# Patient Record
Sex: FEMALE | Race: WHITE | NOT HISPANIC OR LATINO | Employment: FULL TIME
[De-identification: names, ages, dates, MRNs, and addresses within clinical notes are randomized per-mention and may not be internally consistent; named-entity substitution may affect disease eponyms.]

---

## 2017-08-27 ENCOUNTER — HEALTH MAINTENANCE LETTER (OUTPATIENT)
Age: 22
End: 2017-08-27

## 2017-12-05 ENCOUNTER — TRANSFERRED RECORDS (OUTPATIENT)
Dept: HEALTH INFORMATION MANAGEMENT | Facility: CLINIC | Age: 22
End: 2017-12-05

## 2017-12-18 ENCOUNTER — OFFICE VISIT (OUTPATIENT)
Dept: FAMILY MEDICINE | Facility: CLINIC | Age: 22
End: 2017-12-18
Payer: COMMERCIAL

## 2017-12-18 VITALS
HEIGHT: 69 IN | DIASTOLIC BLOOD PRESSURE: 64 MMHG | HEART RATE: 60 BPM | SYSTOLIC BLOOD PRESSURE: 112 MMHG | BODY MASS INDEX: 19.13 KG/M2 | WEIGHT: 129.2 LBS

## 2017-12-18 DIAGNOSIS — Z12.4 SCREENING FOR CERVICAL CANCER: ICD-10-CM

## 2017-12-18 DIAGNOSIS — M25.561 CHRONIC PAIN OF RIGHT KNEE: ICD-10-CM

## 2017-12-18 DIAGNOSIS — G89.29 CHRONIC PAIN OF RIGHT KNEE: ICD-10-CM

## 2017-12-18 DIAGNOSIS — Z01.419 ENCOUNTER FOR GYNECOLOGICAL EXAMINATION WITHOUT ABNORMAL FINDING: Primary | ICD-10-CM

## 2017-12-18 DIAGNOSIS — L30.9 DERMATITIS: ICD-10-CM

## 2017-12-18 PROCEDURE — G0145 SCR C/V CYTO,THINLAYER,RESCR: HCPCS | Performed by: FAMILY MEDICINE

## 2017-12-18 PROCEDURE — 99385 PREV VISIT NEW AGE 18-39: CPT | Performed by: FAMILY MEDICINE

## 2017-12-18 PROCEDURE — 99213 OFFICE O/P EST LOW 20 MIN: CPT | Mod: 25 | Performed by: FAMILY MEDICINE

## 2017-12-18 RX ORDER — TRIAMCINOLONE ACETONIDE 1 MG/G
CREAM TOPICAL
Qty: 15 G | Refills: 0 | Status: SHIPPED | OUTPATIENT
Start: 2017-12-18 | End: 2019-11-07

## 2017-12-18 NOTE — MR AVS SNAPSHOT
After Visit Summary   12/18/2017    Rhiannon Landeros    MRN: [id number]           Patient Information     Date Of Birth          1995        Visit Information        Provider Department      12/18/2017 9:40 AM Heather Resendiz DO [facility]        Today's Diagnoses     Encounter for gynecological examination without abnormal finding    -  1    Screening for cervical cancer        Dermatitis        Chronic pain of right knee          Care Instructions    Check out the exercises for your knee.  Please let us know if things do not improve    We'll try the steroid cream for the belly button.  This can be used twice daily for 7 days and sparingly as needed thereafter.  Please let us know if things do not clear up.      Preventive Health Recommendations  Female Ages 18 to 25     Yearly exam:     See your health care provider every year in order to  o Review health changes.   o Discuss preventive care.    o Review your medicines if your doctor has prescribed any.      You should be tested each year for STDs (sexually transmitted diseases).       After age 20, talk to your provider about how often you should have cholesterol testing.      Starting at age 21, get a Pap test every three years. If you have an abnormal result, your doctor may have you test more often.      If you are at risk for diabetes, you should have a diabetes test (fasting glucose).     Shots:     Get a flu shot each year.     Get a tetanus shot every 10 years.     Consider getting the shot (vaccine) that prevents cervical cancer (Gardasil).    Nutrition:     Eat at least 5 servings of fruits and vegetables each day.    Eat whole-grain bread, whole-wheat pasta and brown rice instead of white grains and rice.    Talk to your provider about Calcium and Vitamin D.     Lifestyle    Exercise at least 150 minutes a week each week (30 minutes a day, 5 days a week). This will help you control your weight and prevent  disease.    Limit alcohol to one drink per day.    No smoking.     Wear sunscreen to prevent skin cancer.    See your dentist every six months for an exam and cleaning.  Preventive Health Recommendations  Female Ages 18 to 25     Yearly exam:   See your health care provider every year in order to  Review health changes.   Discuss preventive care.    Review your medicines if your doctor has prescribed any.    You should be tested each year for STDs (sexually transmitted diseases).     After age 20, talk to your provider about how often you should have cholesterol testing.    Starting at age 21, get a Pap test every three years. If you have an abnormal result, your doctor may have you test more often.    If you are at risk for diabetes, you should have a diabetes test (fasting glucose).     Shots:   Get a flu shot each year.   Get a tetanus shot every 10 years.   Consider getting the shot (vaccine) that prevents cervical cancer (Gardasil).    Nutrition:   Eat at least 5 servings of fruits and vegetables each day.  Eat whole-grain bread, whole-wheat pasta and brown rice instead of white grains and rice.  Talk to your provider about Calcium and Vitamin D.     Lifestyle  Exercise at least 150 minutes a week each week (30 minutes a day, 5 days a week). This will help you control your weight and prevent disease.  Limit alcohol to one drink per day.  No smoking.   Wear sunscreen to prevent skin cancer.  See your dentist every six months for an exam and cleaning.          Follow-ups after your visit        Who to contact     Normal or non-critical lab and imaging results will be communicated to you by MyChart, letter or phone within 4 business days after the clinic has received the results. If you do not hear from us within 7 days, please contact the clinic through [employer] or phone. If you have a critical or abnormal lab result, we will notify you by phone as soon as possible.  Submit refill requests through [employer] or call  "your pharmacy and they will forward the refill request to us. Please allow 3 business days for your refill to be completed.          If you need to speak with a  for additional information , please call: [phone]           Additional Information About Your Visit        [employer] Information     [employer] lets you send messages to your doctor, view your test results, renew your prescriptions, schedule appointments and more. To sign up, go to [email or web address] . Click on \"Log in\" on the left side of the screen, which will take you to the Welcome page. Then click on \"Sign up Now\" on the right side of the page.     You will be asked to enter the access code listed below, as well as some personal information. Please follow the directions to create your username and password.     Your access code is: [email or web address]  Expires: 3/18/2018 10:22 AM     Your access code will  in 90 days. If you need help or a new code, please call your [facility] clinic or [phone].        Care EveryWhere ID     This is your Care EveryWhere ID. This could be used by other organizations to access your [facility] medical records  [id number]        Your Vitals Were     Pulse Height Last Period BMI (Body Mass Index)          60 5' 9\" (1.753 m) 2017 (Approximate) 19.08 kg/m2         Blood Pressure from Last 3 Encounters:   17 112/64   16 112/59   16 117/58    Weight from Last 3 Encounters:   17 129 lb 3.2 oz (58.6 kg)   16 120 lb (54.4 kg)   16 128 lb 6.4 oz (58.2 kg) (50 %)*     * Growth percentiles are based on CDC 2-20 Years data.              We Performed the Following     Pap imaged thin layer screen only - recommended age 21 - 24 years          Today's Medication Changes          These changes are accurate as of: 17 10:22 AM.  If you have any questions, ask your nurse or doctor.               Start taking these medicines.        Dose/Directions    triamcinolone 0.1 % " cream   Commonly known as:  KENALOG   Used for:  Dermatitis   Started by:  Heather Resendiz, DO        Apply sparingly to affected area two times daily for 7 days.  May apply periodically therafter   Quantity:  15 g   Refills:  0            Where to get your medicines      These medications were sent to [facility] - [place], [place] - [address]  [address] [facility] [address]     Phone:  [phone]     triamcinolone 0.1 % cream                Primary Care Provider Office Phone # Fax #    Mook Gracia [phone] [address] [facility] [address]        Equal Access to Services     [facility] : Hadii aad ku hadasho Soomaali, waaxda luqadaha, qaybta kaalmada adeegyada, waxuvaldo campo haycarl felix . So [facility] [phone].    ATENCIÓN: Si habla español, tiene a andres disposición servicios gratuitos de asistencia lingüística. Llame al [phone].    We comply with applicable federal civil rights laws and [place] laws. We do not discriminate on the basis of race, color, national origin, age, disability, sex, sexual orientation, or gender identity.            Thank you!     Thank you for choosing [facility]  for your care. Our goal is always to provide you with excellent care. Hearing back from our patients is one way we can continue to improve our services. Please take a few minutes to complete the written survey that you may receive in the mail after your visit with us. Thank you!             Your Updated Medication List - Protect others around you: Learn how to safely use, store and throw away your medicines at www.disposemymeds.org.          This list is accurate as of: 12/18/17 10:22 AM.  Always use your most recent med list.                   Brand Name Dispense Instructions for use Diagnosis    etonogestrel 68 MG Impl    IMPLANON/NEXPLANON    1 each    1 each (68 mg) by Subdermal route once for 1 dose    Nexplanon  removal, Nexplanon insertion       triamcinolone 0.1 % cream    KENALOG    15 g    Apply sparingly to affected area two times daily for 7 days.  May apply periodically therafter    Dermatitis

## 2017-12-18 NOTE — NURSING NOTE
"Chief Complaint   Patient presents with     Physical     Derm Problem     infection/irritation for the last year to the skin around her naval. She has tried salt water rinses        Initial /64 (BP Location: Left arm, Patient Position: Chair, Cuff Size: Adult Regular)  Pulse 60  Ht 5' 9\" (1.753 m)  Wt 129 lb 3.2 oz (58.6 kg)  LMP 11/20/2017 (Approximate)  BMI 19.08 kg/m2 Estimated body mass index is 19.08 kg/(m^2) as calculated from the following:    Height as of this encounter: 5' 9\" (1.753 m).    Weight as of this encounter: 129 lb 3.2 oz (58.6 kg).  Medication Reconciliation: complete   Brooke Orellana CMA    "

## 2017-12-18 NOTE — LETTER
December 21, 2017      Rhiannon Landeros  [address]  [place] [address]    Dear ,      I am happy to inform you that your recent cervical cancer screening test (PAP smear) was normal.      Preventative screenings such as this help to ensure your health for years to come. You should repeat a pap smear in 3 years, unless otherwise directed.      You will still need to return to the clinic every year for your annual exam and other preventive tests.     Please contact the clinic at [phone] if you have further questions.       Sincerely,      Heather Resendiz DO/dandre

## 2017-12-18 NOTE — PROGRESS NOTES
SUBJECTIVE:   CC: Rhiannon Landeros is an 22 year old woman who presents for preventive health visit.     Chief Complaint   Patient presents with     Physical     Derm Problem     infection/irritation for the last year to the skin around her naval. She has tried salt water rinses    Belly button has been getting crusty starting about 1 year ago.  Used salt water washes for this as recommend by her grandmother.  Sometimes itchy      Had STD screening at [facility], positive for chlamydia.  Was treated 2 weeks ago.  Took her dose and has been abstaining from intercourse.    Intermittent knee pain for years.  Pain in the anterior knee.  Bothered by running.  Was in track in high school when this started.  Helped by a strap on the knee.  No swelling noted.  No locking or catching.  No recent injury.      Healthy Habits:    Do you get at least three servings of calcium containing foods daily (dairy, green leafy vegetables, etc.)? yes    Amount of exercise or daily activities, outside of work: 0 day(s) per week    Problems taking medications regularly No    Medication side effects: No    Have you had an eye exam in the past two years? no    Do you see a dentist twice per year? yes    Do you have sleep apnea, excessive snoring or daytime drowsiness?no    Today's PHQ-2 Score:   PHQ-2 ( 1999 Pfizer) 12/18/2017   Q1: Little interest or pleasure in doing things 0   Q2: Feeling down, depressed or hopeless 0   PHQ-2 Score 0   Abuse: Current or Past(Physical, Sexual or Emotional)- No  Do you feel safe in your environment - Yes  Social History   Substance Use Topics     Smoking status: Never Smoker     Smokeless tobacco: Never Used      Comment: no tobacco exposure     Alcohol use No     If you drink alcohol do you typically have >3 drinks per day or >7 drinks per week? No                     Reviewed orders with patient.  Reviewed health maintenance and updated orders accordingly - Yes    Mammogram not appropriate for  "this patient based on age.    Pertinent mammograms are reviewed under the imaging tab.  History of abnormal Pap smear: NO - age 21-29 PAP every 3 years recommended    Reviewed and updated as needed this visit by clinical staff  Tobacco  Allergies  Meds  Med Hx  Surg Hx  Fam Hx  Soc Hx        Reviewed and updated as needed this visit by Provider  Tobacco  Med Hx  Surg Hx  Fam Hx  Soc Hx       History reviewed. No pertinent past medical history.   History reviewed. No pertinent surgical history.    ROS:  C: NEGATIVE for fever, chills, change in weight  INTEGUMENTARY/SKIN: as above  E: NEGATIVE for vision changes or irritation  ENT: NEGATIVE for ear, mouth and throat problems  R: NEGATIVE for significant cough or SOB  B: NEGATIVE for masses, tenderness or discharge  CV: NEGATIVE for chest pain, palpitations or peripheral edema  GI: NEGATIVE for nausea, abdominal pain, heartburn, or change in bowel habits  : NEGATIVE for unusual urinary or vaginal symptoms. Periods are regular.  MUSCULOSKELETAL:as above  N: NEGATIVE for weakness, dizziness or paresthesias  P: NEGATIVE for changes in mood or affect    OBJECTIVE:   /64 (BP Location: Left arm, Patient Position: Chair, Cuff Size: Adult Regular)  Pulse 60  Ht 5' 9\" (1.753 m)  Wt 129 lb 3.2 oz (58.6 kg)  LMP 11/20/2017 (Approximate)  BMI 19.08 kg/m2  EXAM:  GENERAL: healthy, alert and no distress  EYES: Eyes grossly normal to inspection, PERRL and conjunctivae and sclerae normal  HENT: ear canals and TM's normal, nose and mouth without ulcers or lesions  NECK: no adenopathy, no asymmetry, masses, or scars and thyroid normal to palpation  RESP: lungs clear to auscultation - no rales, rhonchi or wheezes  BREAST: normal without masses, tenderness or nipple discharge and no palpable axillary masses or adenopathy  CV: regular rate and rhythm, normal S1 S2, no S3 or S4, no murmur, click or rub, no peripheral edema and peripheral pulses strong  ABDOMEN: soft, " "nontender, no hepatosplenomegaly, no masses and bowel sounds normal   (female): normal female external genitalia, normal urethral meatus, vaginal mucosa pink, moist, well rugated, and normal cervix/adnexa/uterus without masses or discharge  MS: no gross musculoskeletal defects noted, no edema.  Full pain free ROM of the R knee.  No joint line tenderness.  Ligamentous exam intact  SKIN: no suspicious lesions or rashes, mild erythema and fine scale in the umbilicus  NEURO: Normal strength and tone, mentation intact and speech normal  PSYCH: mentation appears normal, affect normal/bright    ASSESSMENT/PLAN:       ICD-10-CM    1. Encounter for gynecological examination without abnormal finding Z01.419    2. Dermatitis L30.9 triamcinolone (KENALOG) 0.1 % cream   3. Chronic pain of right knee M25.561     G89.29    4. Screening for cervical cancer Z12.4 Pap imaged thin layer screen only - recommended age 21 - 24 years       COUNSELING:   Reviewed preventive health counseling, as reflected in patient instructions         reports that she has never smoked. She has never used smokeless tobacco.    Estimated body mass index is 19.08 kg/(m^2) as calculated from the following:    Height as of this encounter: 5' 9\" (1.753 m).    Weight as of this encounter: 129 lb 3.2 oz (58.6 kg).         Counseling Resources:  ATP IV Guidelines  Pooled Cohorts Equation Calculator  Breast Cancer Risk Calculator  FRAX Risk Assessment  ICSI Preventive Guidelines  Dietary Guidelines for Americans, 2010  USDA's MyPlate  ASA Prophylaxis  Lung CA Screening    Heather Resendiz, DO  [facility]    Patient Instructions   Check out the exercises for your knee.  Please let us know if things do not improve    We'll try the steroid cream for the belly button.  This can be used twice daily for 7 days and sparingly as needed thereafter.  Please let us know if things do not clear up.      Preventive Health Recommendations  Female Ages 18 to 25 "     Yearly exam:     See your health care provider every year in order to  o Review health changes.   o Discuss preventive care.    o Review your medicines if your doctor has prescribed any.      You should be tested each year for STDs (sexually transmitted diseases).       After age 20, talk to your provider about how often you should have cholesterol testing.      Starting at age 21, get a Pap test every three years. If you have an abnormal result, your doctor may have you test more often.      If you are at risk for diabetes, you should have a diabetes test (fasting glucose).     Shots:     Get a flu shot each year.     Get a tetanus shot every 10 years.     Consider getting the shot (vaccine) that prevents cervical cancer (Gardasil).    Nutrition:     Eat at least 5 servings of fruits and vegetables each day.    Eat whole-grain bread, whole-wheat pasta and brown rice instead of white grains and rice.    Talk to your provider about Calcium and Vitamin D.     Lifestyle    Exercise at least 150 minutes a week each week (30 minutes a day, 5 days a week). This will help you control your weight and prevent disease.    Limit alcohol to one drink per day.    No smoking.     Wear sunscreen to prevent skin cancer.    See your dentist every six months for an exam and cleaning.  Preventive Health Recommendations  Female Ages 18 to 25     Yearly exam:   See your health care provider every year in order to  Review health changes.   Discuss preventive care.    Review your medicines if your doctor has prescribed any.    You should be tested each year for STDs (sexually transmitted diseases).     After age 20, talk to your provider about how often you should have cholesterol testing.    Starting at age 21, get a Pap test every three years. If you have an abnormal result, your doctor may have you test more often.    If you are at risk for diabetes, you should have a diabetes test (fasting glucose).     Shots:   Get a flu shot  each year.   Get a tetanus shot every 10 years.   Consider getting the shot (vaccine) that prevents cervical cancer (Gardasil).    Nutrition:   Eat at least 5 servings of fruits and vegetables each day.  Eat whole-grain bread, whole-wheat pasta and brown rice instead of white grains and rice.  Talk to your provider about Calcium and Vitamin D.     Lifestyle  Exercise at least 150 minutes a week each week (30 minutes a day, 5 days a week). This will help you control your weight and prevent disease.  Limit alcohol to one drink per day.  No smoking.   Wear sunscreen to prevent skin cancer.  See your dentist every six months for an exam and cleaning.

## 2017-12-18 NOTE — PATIENT INSTRUCTIONS
Check out the exercises for your knee.  Please let us know if things do not improve    We'll try the steroid cream for the belly button.  This can be used twice daily for 7 days and sparingly as needed thereafter.  Please let us know if things do not clear up.      Preventive Health Recommendations  Female Ages 18 to 25     Yearly exam:     See your health care provider every year in order to  o Review health changes.   o Discuss preventive care.    o Review your medicines if your doctor has prescribed any.      You should be tested each year for STDs (sexually transmitted diseases).       After age 20, talk to your provider about how often you should have cholesterol testing.      Starting at age 21, get a Pap test every three years. If you have an abnormal result, your doctor may have you test more often.      If you are at risk for diabetes, you should have a diabetes test (fasting glucose).     Shots:     Get a flu shot each year.     Get a tetanus shot every 10 years.     Consider getting the shot (vaccine) that prevents cervical cancer (Gardasil).    Nutrition:     Eat at least 5 servings of fruits and vegetables each day.    Eat whole-grain bread, whole-wheat pasta and brown rice instead of white grains and rice.    Talk to your provider about Calcium and Vitamin D.     Lifestyle    Exercise at least 150 minutes a week each week (30 minutes a day, 5 days a week). This will help you control your weight and prevent disease.    Limit alcohol to one drink per day.    No smoking.     Wear sunscreen to prevent skin cancer.    See your dentist every six months for an exam and cleaning.  Preventive Health Recommendations  Female Ages 18 to 25     Yearly exam:   See your health care provider every year in order to  Review health changes.   Discuss preventive care.    Review your medicines if your doctor has prescribed any.    You should be tested each year for STDs (sexually transmitted diseases).     After age 20,  talk to your provider about how often you should have cholesterol testing.    Starting at age 21, get a Pap test every three years. If you have an abnormal result, your doctor may have you test more often.    If you are at risk for diabetes, you should have a diabetes test (fasting glucose).     Shots:   Get a flu shot each year.   Get a tetanus shot every 10 years.   Consider getting the shot (vaccine) that prevents cervical cancer (Gardasil).    Nutrition:   Eat at least 5 servings of fruits and vegetables each day.  Eat whole-grain bread, whole-wheat pasta and brown rice instead of white grains and rice.  Talk to your provider about Calcium and Vitamin D.     Lifestyle  Exercise at least 150 minutes a week each week (30 minutes a day, 5 days a week). This will help you control your weight and prevent disease.  Limit alcohol to one drink per day.  No smoking.   Wear sunscreen to prevent skin cancer.  See your dentist every six months for an exam and cleaning.

## 2017-12-20 LAB
COPATH REPORT: NORMAL
PAP: NORMAL

## 2018-09-25 ENCOUNTER — OFFICE VISIT (OUTPATIENT)
Dept: DERMATOLOGY | Facility: CLINIC | Age: 23
End: 2018-09-25
Payer: COMMERCIAL

## 2018-09-25 VITALS — DIASTOLIC BLOOD PRESSURE: 84 MMHG | OXYGEN SATURATION: 100 % | SYSTOLIC BLOOD PRESSURE: 117 MMHG | HEART RATE: 57 BPM

## 2018-09-25 DIAGNOSIS — L60.3 NAIL DYSTROPHY: Primary | ICD-10-CM

## 2018-09-25 PROCEDURE — 11750 EXCISION NAIL&NAIL MATRIX: CPT | Performed by: DERMATOLOGY

## 2018-09-25 NOTE — LETTER
9/25/2018         RE: Rhiannon Landeros  [address]  [place] [address]        Dear Colleague,    Thank you for referring your patient, Rhiannon Landeros, to the [facility]. Please see a copy of my visit note below.    Rhiannon Landeros is a 23 year old year old female patient here today for hx of dystrophic nail removed she notes single area growing back.  .  Patient states this has been present for months.  Patient reports the following symptoms:  Caught on things. Patient reports the following previous treatments none.  Patient reports the following modifying factors none.  Associated symptoms: none.  Patient has no other skin complaints today.  Remainder of the HPI, Meds, PMH, Allergies, FH, and SH was reviewed in chart.    History reviewed. No pertinent past medical history.    History reviewed. No pertinent surgical history.     Family History   Problem Relation Age of Onset     Hypertension Maternal Grandmother      Diabetes Maternal Grandfather      Hypertension Maternal Grandfather      Breast Cancer Maternal Grandfather      Cancer Other      osteosarcoma     Melanoma No family hx of        Social History     Social History     Marital status: Single     Spouse name: N/A     Number of children: N/A     Years of education: N/A     Occupational History     Not on file.     Social History Main Topics     Smoking status: Never Smoker     Smokeless tobacco: Never Used      Comment: no tobacco exposure     Alcohol use No     Drug use: No     Sexual activity: Yes     Other Topics Concern     Not on file     Social History Narrative       Outpatient Encounter Prescriptions as of 9/25/2018   Medication Sig Dispense Refill     etonogestrel (IMPLANON/NEXPLANON) 68 MG IMPL 1 each (68 mg) by Subdermal route once for 1 dose 1 each 0     triamcinolone (KENALOG) 0.1 % cream Apply sparingly to affected area two times daily for 7 days.  May apply periodically therafter (Patient not taking: Reported on  9/25/2018) 15 g 0     No facility-administered encounter medications on file as of 9/25/2018.              Review Of Systems  Skin: As above  Eyes: negative  Ears/Nose/Throat: negative  Respiratory: No shortness of breath, dyspnea on exertion, cough, or hemoptysis  Cardiovascular: negative  Gastrointestinal: negative  Genitourinary: negative  Musculoskeletal: negative  Neurologic: negative  Psychiatric: negative  Hematologic/Lymphatic/Immunologic: negative  Endocrine: negative      O:   NAD, WDWN, Alert & Oriented, Mood & Affect wnl, Vitals stable   Here today alone   /84  Pulse 57  SpO2 100%   General appearance normal   Vitals stable   Alert, oriented and in no acute distress     L great toen ail with smal strip of nail on left medial aspect      Eyes: Conjunctivae/lids:Normal     ENT: Lips, buccal mucosa, tongue: normal    MSK:Normal    Cardiovascular: peripheral edema none    Pulm: Breathing Normal    Neuro/Psych: Orientation:Normal; Mood/Affect:Normal      A/P:  1. dystrohpic nail  TOE NAIL AVULSION WITH LATERAL MATRICECTOMY:  After consent, anesthesia with 1% lidocaine, and prep, the lateral portion of the nail was elevated from the nail bed, using a periosteal elevator, the lateral 4mm of the nail was avulsed using sterile nail splitter, cautery was applied to matrix at a current of 10, for 5 seconds and then repeated.      The site was dressed in the usual fashion,   Management: Home Instructions   Keep foot elevated for first 24 hours   Change dressing in 24 hours   Consider daily antibiotic ointment (e.g. Bacitracin) until heeled   Water exposure is controversial   Some recommend only showering, but no soakings   Others soak foot in warm soapy water 2-4 times daily for 4-7 days   Avoid trauma to toe for first 2 weeks   Wear loose-fitting shoes   Avoid Running, jumping or other potential injury   Observe for signs of infection       Nail avu      Again, thank you for allowing me to participate in  the care of your patient.        Sincerely,        Maximiliano Ray MD

## 2018-09-25 NOTE — PROGRESS NOTES
Rhiannon Landeros is a 23 year old year old female patient here today for hx of dystrophic nail removed she notes single area growing back.  .  Patient states this has been present for months.  Patient reports the following symptoms:  Caught on things. Patient reports the following previous treatments none.  Patient reports the following modifying factors none.  Associated symptoms: none.  Patient has no other skin complaints today.  Remainder of the HPI, Meds, PMH, Allergies, FH, and SH was reviewed in chart.    History reviewed. No pertinent past medical history.    History reviewed. No pertinent surgical history.     Family History   Problem Relation Age of Onset     Hypertension Maternal Grandmother      Diabetes Maternal Grandfather      Hypertension Maternal Grandfather      Breast Cancer Maternal Grandfather      Cancer Other      osteosarcoma     Melanoma No family hx of        Social History     Social History     Marital status: Single     Spouse name: N/A     Number of children: N/A     Years of education: N/A     Occupational History     Not on file.     Social History Main Topics     Smoking status: Never Smoker     Smokeless tobacco: Never Used      Comment: no tobacco exposure     Alcohol use No     Drug use: No     Sexual activity: Yes     Other Topics Concern     Not on file     Social History Narrative       Outpatient Encounter Prescriptions as of 9/25/2018   Medication Sig Dispense Refill     etonogestrel (IMPLANON/NEXPLANON) 68 MG IMPL 1 each (68 mg) by Subdermal route once for 1 dose 1 each 0     triamcinolone (KENALOG) 0.1 % cream Apply sparingly to affected area two times daily for 7 days.  May apply periodically therafter (Patient not taking: Reported on 9/25/2018) 15 g 0     No facility-administered encounter medications on file as of 9/25/2018.              Review Of Systems  Skin: As above  Eyes: negative  Ears/Nose/Throat: negative  Respiratory: No shortness of breath, dyspnea on exertion,  cough, or hemoptysis  Cardiovascular: negative  Gastrointestinal: negative  Genitourinary: negative  Musculoskeletal: negative  Neurologic: negative  Psychiatric: negative  Hematologic/Lymphatic/Immunologic: negative  Endocrine: negative      O:   NAD, WDWN, Alert & Oriented, Mood & Affect wnl, Vitals stable   Here today alone   /84  Pulse 57  SpO2 100%   General appearance normal   Vitals stable   Alert, oriented and in no acute distress     L great toen ail with smal strip of nail on left medial aspect      Eyes: Conjunctivae/lids:Normal     ENT: Lips, buccal mucosa, tongue: normal    MSK:Normal    Cardiovascular: peripheral edema none    Pulm: Breathing Normal    Neuro/Psych: Orientation:Normal; Mood/Affect:Normal      A/P:  1. dystrohpic nail  TOE NAIL AVULSION WITH LATERAL MATRICECTOMY:  After consent, anesthesia with 1% lidocaine, and prep, the lateral portion of the nail was elevated from the nail bed, using a periosteal elevator, the lateral 4mm of the nail was avulsed using sterile nail splitter, cautery was applied to matrix at a current of 10, for 5 seconds and then repeated.      The site was dressed in the usual fashion,   Management: Home Instructions   Keep foot elevated for first 24 hours   Change dressing in 24 hours   Consider daily antibiotic ointment (e.g. Bacitracin) until heeled   Water exposure is controversial   Some recommend only showering, but no soakings   Others soak foot in warm soapy water 2-4 times daily for 4-7 days   Avoid trauma to toe for first 2 weeks   Wear loose-fitting shoes   Avoid Running, jumping or other potential injury   Observe for signs of infection       Nail avu

## 2018-09-25 NOTE — PATIENT INSTRUCTIONS
Wound Care Instructions     FOR SUPERFICIAL WOUNDS     [facility] [phone]    [facility] [phone]  Left big toe                       AFTER 24 HOURS YOU SHOULD REMOVE THE BANDAGE AND BEGIN DAILY DRESSING CHANGES AS FOLLOWS:     1) Remove Dressing.     2) Clean and dry the area with tap water using a Q-tip or sterile gauze pad.     3) Apply Vaseline, Aquaphor, Polysporin ointment or Bacitracin ointment over entire wound.  Do NOT use Neosporin ointment.     4) Cover the wound with a band-aid, or a sterile non-stick gauze pad and micropore paper tape      REPEAT THESE INSTRUCTIONS AT LEAST ONCE A DAY UNTIL THE WOUND HAS COMPLETELY HEALED.    It is an old wives tale that a wound heals better when it is exposed to air and allowed to dry out. The wound will heal faster with a better cosmetic result if it is kept moist with ointment and covered with a bandage.    **Do not let the wound dry out.**      Supplies Needed:      *Cotton tipped applicators (Q-tips)    *Polysporin Ointment or Bacitracin Ointment (NOT NEOSPORIN)    *Band-aids or non-stick gauze pads and micropore paper tape.      PATIENT INFORMATION:    During the healing process you will notice a number of changes. All wounds develop a small halo of redness surrounding the wound.  This means healing is occurring. Severe itching with extensive redness usually indicates sensitivity to the ointment or bandage tape used to dress the wound.  You should call our office if this develops.      Swelling  and/or discoloration around your surgical site is common, particularly when performed around the eye.    All wounds normally drain.  The larger the wound the more drainage there will be.  After 7-10 days, you will notice the wound beginning to shrink and new skin will begin to grow.  The wound is healed when you can see skin has formed over the entire area.  A healed wound has a healthy, shiny look to the surface and is red to dark  pink in color to normalize.  Wounds may take approximately 4-6 weeks to heal.  Larger wounds may take 6-8 weeks.  After the wound is healed you may discontinue dressing changes.    You may experience a sensation of tightness as your wound heals. This is normal and will gradually subside.    Your healed wound may be sensitive to temperature changes. This sensitivity improves with time, but if you re having a lot of discomfort, try to avoid temperature extremes.    Patients frequently experience itching after their wound appears to have healed because of the continue healing under the skin.  Plain Vaseline will help relieve the itching.        POSSIBLE COMPLICATIONS    BLEEDIN. Leave the bandage in place.  2. Use tightly rolled up gauze or a cloth to apply direct pressure over the bandage for 30  minutes.  3. Reapply pressure for an additional 30 minutes if necessary  4. Use additional gauze and tape to maintain pressure once the bleeding has stopped.

## 2018-09-25 NOTE — NURSING NOTE
"Initial /84  Pulse 57  SpO2 100% Estimated body mass index is 19.08 kg/(m^2) as calculated from the following:    Height as of 12/18/17: 1.753 m (5' 9\").    Weight as of 12/18/17: 58.6 kg (129 lb 3.2 oz). .    Delilah Marquez LPN    "

## 2018-09-25 NOTE — MR AVS SNAPSHOT
After Visit Summary   9/25/2018    Rhiannon Landeros    MRN: [id number]           Patient Information     Date Of Birth          1995        Visit Information        Provider Department      9/25/2018 9:45 AM Maximiliano Ray MD [facility]        Care Instructions          Wound Care Instructions     FOR SUPERFICIAL WOUNDS     [facility] [phone]    [facility] [phone]  Left big toe                       AFTER 24 HOURS YOU SHOULD REMOVE THE BANDAGE AND BEGIN DAILY DRESSING CHANGES AS FOLLOWS:     1) Remove Dressing.     2) Clean and dry the area with tap water using a Q-tip or sterile gauze pad.     3) Apply Vaseline, Aquaphor, Polysporin ointment or Bacitracin ointment over entire wound.  Do NOT use Neosporin ointment.     4) Cover the wound with a band-aid, or a sterile non-stick gauze pad and micropore paper tape      REPEAT THESE INSTRUCTIONS AT LEAST ONCE A DAY UNTIL THE WOUND HAS COMPLETELY HEALED.    It is an old wives tale that a wound heals better when it is exposed to air and allowed to dry out. The wound will heal faster with a better cosmetic result if it is kept moist with ointment and covered with a bandage.    **Do not let the wound dry out.**      Supplies Needed:      *Cotton tipped applicators (Q-tips)    *Polysporin Ointment or Bacitracin Ointment (NOT NEOSPORIN)    *Band-aids or non-stick gauze pads and micropore paper tape.      PATIENT INFORMATION:    During the healing process you will notice a number of changes. All wounds develop a small halo of redness surrounding the wound.  This means healing is occurring. Severe itching with extensive redness usually indicates sensitivity to the ointment or bandage tape used to dress the wound.  You should call our office if this develops.      Swelling  and/or discoloration around your surgical site is common, particularly when performed around the eye.    All wounds normally  drain.  The larger the wound the more drainage there will be.  After 7-10 days, you will notice the wound beginning to shrink and new skin will begin to grow.  The wound is healed when you can see skin has formed over the entire area.  A healed wound has a healthy, shiny look to the surface and is red to dark pink in color to normalize.  Wounds may take approximately 4-6 weeks to heal.  Larger wounds may take 6-8 weeks.  After the wound is healed you may discontinue dressing changes.    You may experience a sensation of tightness as your wound heals. This is normal and will gradually subside.    Your healed wound may be sensitive to temperature changes. This sensitivity improves with time, but if you re having a lot of discomfort, try to avoid temperature extremes.    Patients frequently experience itching after their wound appears to have healed because of the continue healing under the skin.  Plain Vaseline will help relieve the itching.        POSSIBLE COMPLICATIONS    BLEEDIN. Leave the bandage in place.  2. Use tightly rolled up gauze or a cloth to apply direct pressure over the bandage for 30  minutes.  3. Reapply pressure for an additional 30 minutes if necessary  4. Use additional gauze and tape to maintain pressure once the bleeding has stopped.            Follow-ups after your visit        Who to contact     If you have questions or need follow up information about today's clinic visit or your schedule please contact [facility] directly at [phone].  Normal or non-critical lab and imaging results will be communicated to you by [employer], letter or phone within 4 business days after the clinic has received the results. If you do not hear from us within 7 days, please contact the clinic through [employer] or phone. If you have a critical or abnormal lab result, we will notify you by phone as soon as possible.  Submit refill requests through [facility] or call your pharmacy and they will  "forward the refill request to us. Please allow 3 business days for your refill to be completed.          Additional Information About Your Visit        MyChart Information     [employer] lets you send messages to your doctor, view your test results, renew your prescriptions, schedule appointments and more. To sign up, go to [email or web address] . Click on \"Log in\" on the left side of the screen, which will take you to the Welcome page. Then click on \"Sign up Now\" on the right side of the page.     You will be asked to enter the access code listed below, as well as some personal information. Please follow the directions to create your username and password.     Your access code is: [email or web address]  Expires: 2018 10:19 AM     Your access code will  in 90 days. If you need help or a new code, please call your [place] clinic or [phone].        Care EveryWhere ID     This is your Care EveryWhere ID. This could be used by other organizations to access your [facility] medical records  [id number]        Your Vitals Were     Pulse Pulse Oximetry                57 100%           Blood Pressure from Last 3 Encounters:   18 117/84   17 112/64   16 112/59    Weight from Last 3 Encounters:   17 58.6 kg (129 lb 3.2 oz)   16 54.4 kg (120 lb)   16 58.2 kg (128 lb 6.4 oz) (50 %)*     * Growth percentiles are based on CDC 2-20 Years data.              Today, you had the following     No orders found for display       Primary Care Provider Office Phone # Fax #    Mook Gracia [phone] [address] [facility] [address]        Equal Access to Services     KIMO WATSON : Brenda Rodrigues, susan gill, peyton kaalmapedrito maciel, andrade daniel. So [facility] [phone].    ATENCIÓN: Si habla español, tiene a andres disposición servicios gratuitos de asistencia lingüística. Llame al [phone].    We comply with " applicable federal civil rights laws and [place] laws. We do not discriminate on the basis of race, color, national origin, age, disability, sex, sexual orientation, or gender identity.            Thank you!     Thank you for choosing [facility]  for your care. Our goal is always to provide you with excellent care. Hearing back from our patients is one way we can continue to improve our services. Please take a few minutes to complete the written survey that you may receive in the mail after your visit with us. Thank you!             Your Updated Medication List - Protect others around you: Learn how to safely use, store and throw away your medicines at www.disposemymeds.org.          This list is accurate as of 9/25/18 10:19 AM.  Always use your most recent med list.                   Brand Name Dispense Instructions for use Diagnosis    etonogestrel 68 MG Impl    IMPLANON/NEXPLANON    1 each    1 each (68 mg) by Subdermal route once for 1 dose    Nexplanon removal, Nexplanon insertion       triamcinolone 0.1 % cream    KENALOG    15 g    Apply sparingly to affected area two times daily for 7 days.  May apply periodically therafter    Dermatitis

## 2019-01-11 ENCOUNTER — OFFICE VISIT (OUTPATIENT)
Dept: FAMILY MEDICINE | Facility: CLINIC | Age: 24
End: 2019-01-11
Payer: COMMERCIAL

## 2019-01-11 VITALS
HEART RATE: 60 BPM | HEIGHT: 69 IN | TEMPERATURE: 98.2 F | DIASTOLIC BLOOD PRESSURE: 60 MMHG | BODY MASS INDEX: 20.29 KG/M2 | WEIGHT: 137 LBS | SYSTOLIC BLOOD PRESSURE: 120 MMHG | RESPIRATION RATE: 16 BRPM

## 2019-01-11 DIAGNOSIS — Z11.3 SCREEN FOR STD (SEXUALLY TRANSMITTED DISEASE): ICD-10-CM

## 2019-01-11 DIAGNOSIS — Z30.46: Primary | ICD-10-CM

## 2019-01-11 LAB — BETA HCG QUAL IFA URINE: NEGATIVE

## 2019-01-11 PROCEDURE — 87591 N.GONORRHOEAE DNA AMP PROB: CPT | Performed by: NURSE PRACTITIONER

## 2019-01-11 PROCEDURE — 11983 REMOVE/INSERT DRUG IMPLANT: CPT | Performed by: NURSE PRACTITIONER

## 2019-01-11 PROCEDURE — 87491 CHLMYD TRACH DNA AMP PROBE: CPT | Performed by: NURSE PRACTITIONER

## 2019-01-11 PROCEDURE — 84703 CHORIONIC GONADOTROPIN ASSAY: CPT | Performed by: NURSE PRACTITIONER

## 2019-01-11 ASSESSMENT — ENCOUNTER SYMPTOMS
SINUS PRESSURE: 0
EYE DISCHARGE: 0
CHILLS: 0
WHEEZING: 0
SHORTNESS OF BREATH: 0
FATIGUE: 0
LIGHT-HEADEDNESS: 0
COUGH: 0
NAUSEA: 0
DIZZINESS: 0
CONSTIPATION: 0
FEVER: 0
EYE ITCHING: 0
RHINORRHEA: 0
DIARRHEA: 0
HEADACHES: 0
SORE THROAT: 0
DIAPHORESIS: 0

## 2019-01-11 ASSESSMENT — MIFFLIN-ST. JEOR: SCORE: 1441.84

## 2019-01-11 ASSESSMENT — PAIN SCALES - GENERAL: PAINLEVEL: NO PAIN (0)

## 2019-01-11 NOTE — PROGRESS NOTES
SUBJECTIVE:   Rhiannon Landeros is a 23 year old female who presents to clinic today for the following health issues:      Chief Complaint   Patient presents with     Contraception     Remove and Reinsert Nexplanon     Current Nexplanon will  19.    Problem list and histories reviewed & adjusted, as indicated.  Additional history: as documented    Current Outpatient Medications   Medication Sig Dispense Refill     triamcinolone (KENALOG) 0.1 % cream Apply sparingly to affected area two times daily for 7 days.  May apply periodically therafter 15 g 0     etonogestrel (IMPLANON/NEXPLANON) 68 MG IMPL 1 each (68 mg) by Subdermal route once for 1 dose 1 each 0     No Known Allergies    Reviewed and updated as needed this visit by clinical staff  Tobacco  Allergies  Meds  Med Hx  Surg Hx  Fam Hx  Soc Hx      Reviewed and updated as needed this visit by Provider          Here today to have Nexplanon replaced.  It expires next month.  This will be third time that has had it replaced.  Initial reason that had Nexplanon placed was due to to nausea and vomiting and illness when had periods.  Since has had Nexplanon has not had any of that.  Gets period regularly and will have bleeding for 1-2 weeks. Does have some bloating and cramping. Will take the longer periods over getting sick. Is sexually active. No concern for std. Does use condoms.     ROS:  Review of Systems   Constitutional: Negative for chills, diaphoresis, fatigue and fever.   HENT: Negative for ear discharge, ear pain, hearing loss, rhinorrhea, sinus pressure and sore throat.    Eyes: Negative for discharge and itching.   Respiratory: Negative for cough, shortness of breath and wheezing.    Gastrointestinal: Negative for constipation, diarrhea and nausea.   Skin: Negative for rash.   Neurological: Negative for dizziness, light-headedness and headaches.         OBJECTIVE:     /60 (BP Location: Right arm, Patient Position: Sitting, Cuff Size:  "Adult Regular)   Pulse 60   Temp 98.2  F (36.8  C) (Tympanic)   Resp 16   Ht 1.746 m (5' 8.75\")   Wt 62.1 kg (137 lb)   LMP 01/08/2019   BMI 20.38 kg/m    Body mass index is 20.38 kg/m .  Physical Exam   Constitutional: She appears well-developed.   HENT:   Head: Normocephalic.   Cardiovascular: Normal rate, regular rhythm and normal heart sounds.   Pulmonary/Chest: Effort normal and breath sounds normal.   Neurological: She is alert.   Skin: Skin is warm.     Procedure Note - Etonogestrel Implant Replacement    HPI: Rhiannon Landeros is here for Nexplanon (etonogestrel implant) replacement  Indication: contraception  STI history:   None      Counselling and Consent:  Affirmation of informed consent was signed and scanned into the medical record. Risks, benefits and alternatives were discussed.   Instructed on use of condoms for STI prevention.  Patient's questions were elicited and answered.        Preoperative Diagnosis:  Nexplanon Replacement  Postoperative Diagnosis:  same     Technique:   Skin prep Betadine  Anesthesia 1% lidocaine, with epi  EBL:   minimal  Complications: No  Tolerance:  Pt tolerated procedure well and was in stable condition.     Pt was positioned on exam table with left arm flexed and externally rotated. Nexplanon device was palpated without difficulty.  Area was prepped with betadine.  Anesthesia provided at the insertion site and along insertion tracj, Etonogestrel implant was then removed without difficulty using forceps.    Etonogestrel implant was then inserted subdermally in usual fashion. Provider and patient confirmed placement by palpating the device. Pressure dressing applied and procedure complete.     Follow up:  Pt was instructed to call if bleeding, severe pain or foul smell.  Instructed to remove pressure dressing after 24 hours, then may keep insertion site covered with a bandaid until it is healed.  Instructed that she requires removal or replacement of the device in 3 " years.        ASSESSMENT/PLAN:   1. Encounter for surveillance of Implanon subdermal contraceptive  Care instructions given.  Plan to return in 3 years for replacement.  Should follow-up in 1 year for physical exam.  - Beta HCG qual IFA urine  - etonogestrel (IMPLANON/NEXPLANON) subdermal implant 68 mg; 1 each (68 mg) by Subdermal route once  - REMOVE & REINSERT NON-BIODEGRADABLE DRUG DELIVERY IMPLANT  - ETONOGESTREL IMPLANT SYSTEM    2. Screen for STD (sexually transmitted disease)  - Chlamydia trachomatis PCR  - Neisseria gonorrhoeae PCR        CINDY Torres [facility]

## 2019-01-11 NOTE — PATIENT INSTRUCTIONS
These are general instructions and may not be specific to you. Please call, email or follow up if you have any questions or concerns.     You may remove the pressure bandage after 24 hours.  Keep the area covered with a Band-Aid until it is completely healed, typically that is 5-7 days total.  Okay to shower, do not submerge in water-no tub baths, swimming, hot tubs.    Contact the clinic immediately if you develop any signs of infection such as warmth, redness, fever or discharge from the area.     You should use condoms to protect against unwanted pregnancy for 7 days after the date of insertion.

## 2019-01-11 NOTE — LETTER
January 14, 2019      Rhiannon Landeros  [address] [place] [address]            Rhiannon,     You are negative for sexually transmitted diseases. Please use condoms with every sexual encounter.       Resulted Orders   Chlamydia trachomatis PCR   Result Value Ref Range    Specimen Description Urine     Chlamydia Trachomatis PCR Negative NEG^Negative      Comment:      Negative for C. trachomatis rRNA by transcription mediated amplification.  A negative result by transcription mediated amplification does not preclude   the presence of C. trachomatis infection because results are dependent on   proper and adequate collection, absence of inhibitors, and sufficient rRNA to   be detected.     Neisseria gonorrhoeae PCR   Result Value Ref Range    Specimen Descrip Urine     N Gonorrhea PCR Negative NEG^Negative      Comment:      Negative for N. gonorrhoeae rRNA by transcription mediated amplification.  A negative result by transcription mediated amplification does not preclude   the presence of N. gonorrhoeae infection because results are dependent on   proper and adequate collection, absence of inhibitors, and sufficient rRNA to   be detected.     Beta HCG qual IFA urine   Result Value Ref Range    Beta HCG Qual IFA Urine Negative NEG^Negative          If you have any questions or concerns, please call the clinic at the number listed above.       Sincerely,        CINDY Torres CNP/ag

## 2019-01-13 LAB
C TRACH DNA SPEC QL NAA+PROBE: NEGATIVE
N GONORRHOEA DNA SPEC QL NAA+PROBE: NEGATIVE
SPECIMEN SOURCE: NORMAL
SPECIMEN SOURCE: NORMAL

## 2019-01-14 NOTE — RESULT ENCOUNTER NOTE
Rhiannon,     You are negative for sexually transmitted diseases. Please use condoms with every sexual encounter.    Daija CONCEPCIONC

## 2019-01-31 NOTE — PROGRESS NOTES
SUBJECTIVE:   CC: Rhiannon Landeros is an 23 year old woman who presents for preventive health visit.     Healthy Habits:    Do you get at least three servings of calcium containing foods daily (dairy, green leafy vegetables, etc.)? yes    Amount of exercise or daily activities, outside of work: 3-4 day(s) per week    Problems taking medications regularly No    Medication side effects: No    Have you had an eye exam in the past two years? no    Do you see a dentist twice per year? yes    Do you have sleep apnea, excessive snoring or daytime drowsiness?no        Continues to have issues with belly button dermatitis.  Using steroid cream prn.  Feels it flares for a couple of days a few times per month.  Steroid crema has improved but not resolved things.  Wondering about additional steps.    Today's PHQ-2 Score:   PHQ-2 ( 1999 Pfizer) 2/1/2019 12/18/2017   Q1: Little interest or pleasure in doing things 0 0   Q2: Feeling down, depressed or hopeless 0 0   PHQ-2 Score 0 0       Abuse: Current or Past(Physical, Sexual or Emotional)- No  Do you feel safe in your environment? Yes    Social History     Tobacco Use     Smoking status: Never Smoker     Smokeless tobacco: Never Used     Tobacco comment: no tobacco exposure   Substance Use Topics     Alcohol use: No     If you drink alcohol do you typically have >3 drinks per day or >7 drinks per week? No                     Reviewed orders with patient.  Reviewed health maintenance and updated orders accordingly - Yes    Mammogram not appropriate for this patient based on age.    Pertinent mammograms are reviewed under the imaging tab.  History of abnormal Pap smear: NO - age 21-29 PAP every 3 years recommended  PAP / HPV 12/18/2017   PAP NIL     Reviewed and updated as needed this visit by clinical staff  Tobacco  Allergies  Meds  Med Hx  Surg Hx  Fam Hx  Soc Hx        Reviewed and updated as needed this visit by Provider  Tobacco  Meds  Med Hx  Surg Hx  Fam Hx   "Soc Hx       History reviewed. No pertinent past medical history.   History reviewed. No pertinent surgical history.    ROS:  CONSTITUTIONAL: NEGATIVE for fever, chills, change in weight  INTEGUMENTARU/SKIN: as above  EYES: NEGATIVE for vision changes or irritation  ENT: NEGATIVE for ear, mouth and throat problems  RESP: NEGATIVE for significant cough or SOB  BREAST: NEGATIVE for masses, tenderness or discharge  CV: NEGATIVE for chest pain, palpitations or peripheral edema  GI: NEGATIVE for nausea, abdominal pain, heartburn, or change in bowel habits  : NEGATIVE for unusual urinary or vaginal symptoms. Periods are regular.  MUSCULOSKELETAL: NEGATIVE for significant arthralgias or myalgia  NEURO: NEGATIVE for weakness, dizziness or paresthesias  PSYCHIATRIC: NEGATIVE for changes in mood or affect    OBJECTIVE:   /72   Pulse 60   Temp 98.6  F (37  C) (Tympanic)   Ht 1.753 m (5' 9\")   Wt 61.7 kg (136 lb)   LMP 01/26/2019   Breastfeeding? No   BMI 20.08 kg/m    EXAM:  GENERAL: healthy, alert and no distress  EYES: Eyes grossly normal to inspection, PERRL and conjunctivae and sclerae normal  HENT: ear canals and TM's normal, nose and mouth without ulcers or lesions  NECK: no adenopathy, no asymmetry, masses, or scars and thyroid normal to palpation  RESP: lungs clear to auscultation - no rales, rhonchi or wheezes  CV: regular rate and rhythm, normal S1 S2, no S3 or S4, no murmur, click or rub, no peripheral edema and peripheral pulses strong  ABDOMEN: soft, nontender, no hepatosplenomegaly, no masses and bowel sounds normal  MS: no gross musculoskeletal defects noted, no edema  SKIN: erythema with scant scale in umbilicus  NEURO: Normal strength and tone, mentation intact and speech normal  PSYCH: mentation appears normal, affect normal/bright    Diagnostic Test Results:  none     ASSESSMENT/PLAN:       ICD-10-CM    1. Routine history and physical examination of adult Z00.00    2. Dermatitis L30.9 " "DERMATOLOGY REFERRAL       COUNSELING:   Reviewed preventive health counseling, as reflected in patient instructions  Special attention given to:        Regular exercise       Healthy diet/nutrition       Contraception       Safe sex practices/STD prevention    BP Readings from Last 1 Encounters:   02/01/19 126/72     Estimated body mass index is 20.08 kg/m  as calculated from the following:    Height as of this encounter: 1.753 m (5' 9\").    Weight as of this encounter: 61.7 kg (136 lb).           reports that  has never smoked. she has never used smokeless tobacco.      Counseling Resources:  ATP IV Guidelines  Pooled Cohorts Equation Calculator  Breast Cancer Risk Calculator  FRAX Risk Assessment  ICSI Preventive Guidelines  Dietary Guidelines for Americans, 2010  USDA's MyPlate  ASA Prophylaxis  Lung CA Screening    Heather Resendiz DO  [facility]  "

## 2019-02-01 ENCOUNTER — OFFICE VISIT (OUTPATIENT)
Dept: FAMILY MEDICINE | Facility: CLINIC | Age: 24
End: 2019-02-01
Payer: COMMERCIAL

## 2019-02-01 VITALS
TEMPERATURE: 98.6 F | WEIGHT: 136 LBS | SYSTOLIC BLOOD PRESSURE: 126 MMHG | HEART RATE: 60 BPM | BODY MASS INDEX: 20.14 KG/M2 | HEIGHT: 69 IN | DIASTOLIC BLOOD PRESSURE: 72 MMHG

## 2019-02-01 DIAGNOSIS — L30.9 DERMATITIS: ICD-10-CM

## 2019-02-01 DIAGNOSIS — Z00.00 ROUTINE HISTORY AND PHYSICAL EXAMINATION OF ADULT: Primary | ICD-10-CM

## 2019-02-01 PROCEDURE — 99395 PREV VISIT EST AGE 18-39: CPT | Performed by: FAMILY MEDICINE

## 2019-02-01 ASSESSMENT — MIFFLIN-ST. JEOR: SCORE: 1441.27

## 2019-02-01 NOTE — NURSING NOTE
"Initial /72   Pulse 60   Temp 98.6  F (37  C) (Tympanic)   Ht 1.753 m (5' 9\")   Wt 61.7 kg (136 lb)   LMP 01/26/2019   Breastfeeding? No   BMI 20.08 kg/m   Estimated body mass index is 20.08 kg/m  as calculated from the following:    Height as of this encounter: 1.753 m (5' 9\").    Weight as of this encounter: 61.7 kg (136 lb). .      "

## 2019-08-13 ENCOUNTER — OFFICE VISIT (OUTPATIENT)
Dept: DERMATOLOGY | Facility: CLINIC | Age: 24
End: 2019-08-13
Payer: COMMERCIAL

## 2019-08-13 VITALS — DIASTOLIC BLOOD PRESSURE: 66 MMHG | HEART RATE: 48 BPM | SYSTOLIC BLOOD PRESSURE: 118 MMHG

## 2019-08-13 DIAGNOSIS — L30.9 DERMATITIS: Primary | ICD-10-CM

## 2019-08-13 PROCEDURE — 99214 OFFICE O/P EST MOD 30 MIN: CPT | Performed by: PHYSICIAN ASSISTANT

## 2019-08-13 RX ORDER — TRIAMCINOLONE ACETONIDE 5 MG/G
CREAM TOPICAL 2 TIMES DAILY
Qty: 15 G | Refills: 1 | Status: SHIPPED | OUTPATIENT
Start: 2019-08-13 | End: 2021-12-14

## 2019-08-13 NOTE — PATIENT INSTRUCTIONS
Proper skin care from [facility]:    -Eliminate harsh soaps as they strip the natural oils from the skin, often resulting in dry itchy skin ( i.e. Dial, Zest, Latesha Spring)  -Use mild soaps such as Cetaphil or Dove Sensitive Skin in the shower. You do not need to use soap on arms, legs, and trunk every time you shower unless visibly soiled.   -Avoid hot or cold showers.  -After showering, lightly dry off and apply moisturizing within 2-3 minutes. This will help trap moisture in the skin.   -Aggressive use of a moisturizer at least 1-2 times a day to the entire body (including -Vanicream, Cetaphil, Aquaphor or Cerave) and moisturize hands after every washing.  -We recommend using moisturizers that come in a tub that needs to be scooped out, not a pump. This has more of an oil base. It will hold moisture in your skin much better than a water base moisturizer. The above recommended are non-pore clogging.      Wear a sunscreen with at least SPF 30 on your face, ears, neck and V of the chest daily. Wear sunscreen on other areas of the body if those areas are exposed to the sun throughout the day. Sunscreens can contain physical and/or chemical blockers. Physical blockers are less likely to clog pores, these include zinc oxide and titanium dioxide. Reapply every two hour and after swimming. Sunscreen examples include Neutrogena, CeraVe, Blue Lizard, Elta MD and many others.    UV radiation  UVA radiation remains constant throughout the day and throughout the year. It is a longer wavelength than UVB and therefore penetrates deeper into the skin leading to immediate and delayed tanning, photoaging, and skin cancer. 70-80% of UVA and UVB radiation occurs between the hours of 10am-2pm.  UVB radiation  UVB radiation causes the most harmful effects and is more significant during the summer months. However, snow and ice can reflect UVB radiation leading to skin damage during the winter months as well. UVB radiation is  responsible for tanning, burning, inflammation, delayed erythema (pinkness), pigmentation (brown spots), and skin cancer.       dermatitis  Proper skin care  Moisurize daily with vaseline, aquaphor, or desitin  Apply a thin layer of triamcinolon 0.5% to affected area 2x a day for 1-2 weeks. Tapering with improvement. Do not use on face or body folds.  Discussed side effects of topical steroids including but not limited to atrophy (skin thinning), striae (stretch marks) telangiectasias, steroid acne, and others. Do not apply to normal skin. Do not apply to discolored skin that does not have rash present. Educated patient on post inflammatory hyperpigmentation.     Proper skin care from [facility]:    -Eliminate harsh soaps as they strip the natural oils from the skin, often resulting in dry itchy skin ( i.e. Dial, Zest, Latesha Spring)  -Use mild soaps such as Cetaphil or Dove Sensitive Skin in the shower. You do not need to use soap on arms, legs, and trunk every time you shower unless visibly soiled.   -Avoid hot or cold showers.  -After showering, lightly dry off and apply moisturizing within 2-3 minutes. This will help trap moisture in the skin.   -Aggressive use of a moisturizer at least 1-2 times a day to the entire body (including -Vanicream, Cetaphil, Aquaphor or Cerave) and moisturize hands after every washing.  -We recommend using moisturizers that come in a tub that needs to be scooped out, not a pump. This has more of an oil base. It will hold moisture in your skin much better than a water base moisturizer. The above recommended are non-pore clogging.

## 2019-08-13 NOTE — PROGRESS NOTES
HPI:  Rhiannon Landeros is a 24 year old female patient here today for rash on umbilicus .  Patient states this has been present for on and off for 3 years.  Patient reports the following symptoms: bothersome, rash .  Patient reports the following previous treatments: TAC 1%.  Patient reports the following modifying factors: none.  Associated symptoms: none.  Pt has a history of eczema on UE. Patient has no other skin complaints today.  Remainder of the HPI, Meds, PMH, Allergies, FH, and SH was reviewed in chart.      History reviewed. No pertinent past medical history.    History reviewed. No pertinent surgical history.     Family History   Problem Relation Age of Onset     Hypertension Maternal Grandmother      Diabetes Maternal Grandfather      Hypertension Maternal Grandfather      Breast Cancer Maternal Grandfather      Cancer Other         osteosarcoma     Melanoma No family hx of        Social History     Socioeconomic History     Marital status: Single     Spouse name: Not on file     Number of children: Not on file     Years of education: Not on file     Highest education level: Not on file   Occupational History     Not on file   Social Needs     Financial resource strain: Not on file     Food insecurity:     Worry: Not on file     Inability: Not on file     Transportation needs:     Medical: Not on file     Non-medical: Not on file   Tobacco Use     Smoking status: Never Smoker     Smokeless tobacco: Never Used     Tobacco comment: no tobacco exposure   Substance and Sexual Activity     Alcohol use: No     Drug use: No     Sexual activity: Yes     Birth control/protection: Implant   Lifestyle     Physical activity:     Days per week: Not on file     Minutes per session: Not on file     Stress: Not on file   Relationships     Social connections:     Talks on phone: Not on file     Gets together: Not on file     Attends Restorationist service: Not on file     Active member of club or organization: Not on file      Attends meetings of clubs or organizations: Not on file     Relationship status: Not on file     Intimate partner violence:     Fear of current or ex partner: Not on file     Emotionally abused: Not on file     Physically abused: Not on file     Forced sexual activity: Not on file   Other Topics Concern     Parent/sibling w/ CABG, MI or angioplasty before 65F 55M? Not Asked   Social History Narrative     Not on file       Outpatient Encounter Medications as of 8/13/2019   Medication Sig Dispense Refill     triamcinolone (KENALOG) 0.1 % cream Apply sparingly to affected area two times daily for 7 days.  May apply periodically therafter (Patient not taking: Reported on 8/13/2019) 15 g 0     Facility-Administered Encounter Medications as of 8/13/2019   Medication Dose Route Frequency Provider Last Rate Last Dose     etonogestrel (IMPLANON/NEXPLANON) subdermal implant 68 mg  1 each Subdermal Once Daija Aceves APRN CNP           Review Of Systems:  Skin: As above  Eyes: negative  Ears/Nose/Throat: negative  Respiratory: No shortness of breath, dyspnea on exertion, cough, or hemoptysis  Cardiovascular: negative  Gastrointestinal: negative  Genitourinary: negative  Musculoskeletal: negative  Neurologic: negative  Psychiatric: negative  Hematologic/Lymphatic/Immunologic: negative  Endocrine: negative      Objective:     /66   Pulse (!) 48   Eyes: Conjunctivae/lids: Normal   ENT: Lips:  Normal  MSK: Normal  Cardiovascular: Peripheral edema none  Pulm: Breathing Normal  Neuro/Psych: Orientation: Normal; Mood/Affect: Normal, NAD, WDWN  Pt accompanied by: self  Following areas examined: face, neck, hands, abdomen, umbilicus  Vizcarra skin type:i   Findings:  Pink scaly patches on umbilicus.   Assessment and Plan:  1) dermatitis  Proper skin care  Moisurize daily with vaseline, aquaphor, or desitin  Apply a thin layer of triamcinolone 0.5% to affected area 2x a day for 1-2 weeks. Tapering with improvement.  Do not use on face or body folds.  Discussed side effects of topical steroids including but not limited to atrophy (skin thinning), striae (stretch marks) telangiectasias, steroid acne, and others. Do not apply to normal skin. Do not apply to discolored skin that does not have rash present. Educated patient on post inflammatory hyperpigmentation.     Proper skin care from [facility]:    -Eliminate harsh soaps as they strip the natural oils from the skin, often resulting in dry itchy skin ( i.e. Dial, Zest, Latesha Spring)  -Use mild soaps such as Cetaphil or Dove Sensitive Skin in the shower. You do not need to use soap on arms, legs, and trunk every time you shower unless visibly soiled.   -Avoid hot or cold showers.  -After showering, lightly dry off and apply moisturizing within 2-3 minutes. This will help trap moisture in the skin.   -Aggressive use of a moisturizer at least 1-2 times a day to the entire body (including -Vanicream, Cetaphil, Aquaphor or Cerave) and moisturize hands after every washing.  -We recommend using moisturizers that come in a tub that needs to be scooped out, not a pump. This has more of an oil base. It will hold moisture in your skin much better than a water base moisturizer. The above recommended are non-pore clogging.             Follow up in 2-3 weeks

## 2019-08-13 NOTE — LETTER
8/13/2019         RE: Rhiannon Landeros  [address]  [place] [address]        Dear Colleague,    Thank you for referring your patient, Rhiannon Landeros, to the [facility]. Please see a copy of my visit note below.    HPI:  Rhiannon Landeros is a 24 year old female patient here today for rash on umbilicus .  Patient states this has been present for on and off for 3 years.  Patient reports the following symptoms: bothersome, rash .  Patient reports the following previous treatments: TAC 1%.  Patient reports the following modifying factors: none.  Associated symptoms: none.  Pt has a history of eczema on UE. Patient has no other skin complaints today.  Remainder of the HPI, Meds, PMH, Allergies, FH, and SH was reviewed in chart.      History reviewed. No pertinent past medical history.    History reviewed. No pertinent surgical history.     Family History   Problem Relation Age of Onset     Hypertension Maternal Grandmother      Diabetes Maternal Grandfather      Hypertension Maternal Grandfather      Breast Cancer Maternal Grandfather      Cancer Other         osteosarcoma     Melanoma No family hx of        Social History     Socioeconomic History     Marital status: Single     Spouse name: Not on file     Number of children: Not on file     Years of education: Not on file     Highest education level: Not on file   Occupational History     Not on file   Social Needs     Financial resource strain: Not on file     Food insecurity:     Worry: Not on file     Inability: Not on file     Transportation needs:     Medical: Not on file     Non-medical: Not on file   Tobacco Use     Smoking status: Never Smoker     Smokeless tobacco: Never Used     Tobacco comment: no tobacco exposure   Substance and Sexual Activity     Alcohol use: No     Drug use: No     Sexual activity: Yes     Birth control/protection: Implant   Lifestyle     Physical activity:     Days per week: Not on file     Minutes  per session: Not on file     Stress: Not on file   Relationships     Social connections:     Talks on phone: Not on file     Gets together: Not on file     Attends Restorationist service: Not on file     Active member of club or organization: Not on file     Attends meetings of clubs or organizations: Not on file     Relationship status: Not on file     Intimate partner violence:     Fear of current or ex partner: Not on file     Emotionally abused: Not on file     Physically abused: Not on file     Forced sexual activity: Not on file   Other Topics Concern     Parent/sibling w/ CABG, MI or angioplasty before 65F 55M? Not Asked   Social History Narrative     Not on file       Outpatient Encounter Medications as of 8/13/2019   Medication Sig Dispense Refill     triamcinolone (KENALOG) 0.1 % cream Apply sparingly to affected area two times daily for 7 days.  May apply periodically therafter (Patient not taking: Reported on 8/13/2019) 15 g 0     Facility-Administered Encounter Medications as of 8/13/2019   Medication Dose Route Frequency Provider Last Rate Last Dose     etonogestrel (IMPLANON/NEXPLANON) subdermal implant 68 mg  1 each Subdermal Once Daija Aceves, APRN CNP           Review Of Systems:  Skin: As above  Eyes: negative  Ears/Nose/Throat: negative  Respiratory: No shortness of breath, dyspnea on exertion, cough, or hemoptysis  Cardiovascular: negative  Gastrointestinal: negative  Genitourinary: negative  Musculoskeletal: negative  Neurologic: negative  Psychiatric: negative  Hematologic/Lymphatic/Immunologic: negative  Endocrine: negative      Objective:     /66   Pulse (!) 48   Eyes: Conjunctivae/lids: Normal   ENT: Lips:  Normal  MSK: Normal  Cardiovascular: Peripheral edema none  Pulm: Breathing Normal  Neuro/Psych: Orientation: Normal; Mood/Affect: Normal, NAD, WDWN  Pt accompanied by: self  Following areas examined: face, neck, hands, abdomen, umbilicus  Vizcarra skin type:i    Findings:  Pink scaly patches on umbilicus.   Assessment and Plan:  1) dermatitis  Proper skin care  Moisurize daily with vaseline, aquaphor, or desitin  Apply a thin layer of triamcinolone 0.5% to affected area 2x a day for 1-2 weeks. Tapering with improvement. Do not use on face or body folds.  Discussed side effects of topical steroids including but not limited to atrophy (skin thinning), striae (stretch marks) telangiectasias, steroid acne, and others. Do not apply to normal skin. Do not apply to discolored skin that does not have rash present. Educated patient on post inflammatory hyperpigmentation.     Proper skin care from [facility]:    -Eliminate harsh soaps as they strip the natural oils from the skin, often resulting in dry itchy skin ( i.e. Dial, Zest, [place] Spring)  -Use mild soaps such as Cetaphil or Dove Sensitive Skin in the shower. You do not need to use soap on arms, legs, and trunk every time you shower unless visibly soiled.   -Avoid hot or cold showers.  -After showering, lightly dry off and apply moisturizing within 2-3 minutes. This will help trap moisture in the skin.   -Aggressive use of a moisturizer at least 1-2 times a day to the entire body (including -Vanicream, Cetaphil, Aquaphor or Cerave) and moisturize hands after every washing.  -We recommend using moisturizers that come in a tub that needs to be scooped out, not a pump. This has more of an oil base. It will hold moisture in your skin much better than a water base moisturizer. The above recommended are non-pore clogging.             Follow up in 2-3 weeks      Again, thank you for allowing me to participate in the care of your patient.        Sincerely,        Christianne Post PA-C

## 2019-11-07 ENCOUNTER — HEALTH MAINTENANCE LETTER (OUTPATIENT)
Age: 24
End: 2019-11-07

## 2019-11-07 ENCOUNTER — OFFICE VISIT (OUTPATIENT)
Dept: FAMILY MEDICINE | Facility: CLINIC | Age: 24
End: 2019-11-07
Payer: COMMERCIAL

## 2019-11-07 VITALS
BODY MASS INDEX: 20.38 KG/M2 | HEART RATE: 58 BPM | OXYGEN SATURATION: 100 % | DIASTOLIC BLOOD PRESSURE: 73 MMHG | RESPIRATION RATE: 20 BRPM | WEIGHT: 138 LBS | TEMPERATURE: 98.3 F | SYSTOLIC BLOOD PRESSURE: 128 MMHG

## 2019-11-07 DIAGNOSIS — Z23 NEED FOR PROPHYLACTIC VACCINATION AND INOCULATION AGAINST INFLUENZA: ICD-10-CM

## 2019-11-07 DIAGNOSIS — J20.9 ACUTE BRONCHITIS WITH SYMPTOMS > 10 DAYS: Primary | ICD-10-CM

## 2019-11-07 DIAGNOSIS — R07.0 THROAT PAIN: ICD-10-CM

## 2019-11-07 LAB
DEPRECATED S PYO AG THROAT QL EIA: NORMAL
SPECIMEN SOURCE: NORMAL

## 2019-11-07 PROCEDURE — 87081 CULTURE SCREEN ONLY: CPT | Performed by: NURSE PRACTITIONER

## 2019-11-07 PROCEDURE — 90686 IIV4 VACC NO PRSV 0.5 ML IM: CPT | Performed by: NURSE PRACTITIONER

## 2019-11-07 PROCEDURE — 90471 IMMUNIZATION ADMIN: CPT | Performed by: NURSE PRACTITIONER

## 2019-11-07 PROCEDURE — 87880 STREP A ASSAY W/OPTIC: CPT | Performed by: NURSE PRACTITIONER

## 2019-11-07 PROCEDURE — 99213 OFFICE O/P EST LOW 20 MIN: CPT | Mod: 25 | Performed by: NURSE PRACTITIONER

## 2019-11-07 RX ORDER — AZITHROMYCIN 250 MG/1
TABLET, FILM COATED ORAL
Qty: 6 TABLET | Refills: 0 | Status: SHIPPED | OUTPATIENT
Start: 2019-11-07 | End: 2019-11-12

## 2019-11-07 RX ORDER — BENZONATATE 100 MG/1
100-200 CAPSULE ORAL 3 TIMES DAILY PRN
Qty: 42 CAPSULE | Refills: 0 | Status: SHIPPED | OUTPATIENT
Start: 2019-11-07 | End: 2021-12-14

## 2019-11-07 NOTE — PATIENT INSTRUCTIONS
Reminders: If you are signed up for [facility] please be aware your results and communications will be sent within your mychart. Please remember to arrive 5-10 minutes early for your appointments. If you are late you may need to reschedule your appointment.      Take full course of antibiotics with daily probiotics or yogurt.  Read below information.  Follow up if symptoms last longer than 10 days or worsen.         Patient Education     Bronchitis, Antibiotic Treatment (Adult)    Bronchitis is an infection of the air passages (bronchial tubes) in your lungs. It often occurs when you have a cold. This illness is contagious during the first few days and is spread through the air by coughing and sneezing, or by direct contact (touching the sick person and then touching your own eyes, nose, or mouth).  Symptoms of bronchitis include cough with mucus (phlegm) and low-grade fever. Bronchitis usually lasts 7 to 14 days. Mild cases can be treated with simple home remedies. More severe infection is treated with an antibiotic.  Home care  Follow these guidelines when caring for yourself at home:    If your symptoms are severe, rest at home for the first 2 to 3 days. When you go back to your usual activities, don't let yourself get too tired.    Don't smoke. Also stay away from secondhand smoke.    You may use over-the-counter medicines to control fever or pain, unless another medicine was prescribed. If you have chronic liver or kidney disease or have ever had a stomach ulcer or gastrointestinal bleeding, talk with your healthcare provider before using these medicines. Also talk to your provider if you are taking medicine to prevent blood clots. Aspirin should never be given to anyone younger than 18 who is ill with a viral infection or fever. It may cause severe liver or brain damage.    Your appetite may be low, so a light diet is fine. Stay well hydrated by drinking 6 to 8 glasses of fluids per day. This includes water, soft  drinks, sports drinks, juices, tea, or soup. Extra fluids will help loosen mucus in your nose and lungs.    Over-the-counter cough, cold, and sore-throat medicines will not shorten the length of the illness, but they may be helpful to reduce your symptoms. Don't use decongestants if you have high blood pressure.    Finish all antibiotic medicine. Do this even if you are feeling better after only a few days.  Follow-up care  Follow up with your healthcare provider, or as advised. If you had an X-ray or ECG (electrocardiogram), a specialist will review it. You will be told of any new test results that may affect your care.  If you are age 65 or older, if you smoke, or if you have a chronic lung disease or condition that affects your immune system, ask your healthcare provider about getting a pneumococcal vaccine and a yearly flu shot (influenza vaccine).  When to seek medical advice  Call your healthcare provider right away if any of these occur:    Fever of 100.4 F (38 C) or higher, or as directed by your healthcare provider    Coughing up more sputum    Weakness, drowsiness, headache, facial pain, ear pain, or a stiff neck  Call 911  Call 911 if any of these occur.    Coughing up blood    Weakness, drowsiness, headache, or stiff neck that get worse    Trouble breathing, wheezing, or pain with breathing  Date Last Reviewed: 6/1/2018 2000-2018 [employer]. [address] [place] [address]. All rights reserved. This information is not intended as a substitute for professional medical care. Always follow your healthcare professional's instructions.

## 2019-11-07 NOTE — PROGRESS NOTES
SUBJECTIVE:  Rhiannon Landeros  is a 24 year old  female  who presents with the following concerns;    Symptom duration:  2.5 weeks   Sympom severity:  severe   Treatments tried:  cough drops, Ibu   Contacts:  none              Symptoms: Present Comment   Fever/Chills x chills   Fatigue x    Muscle Aches x    Eye Irritation     Sneezing x    Nasal Davion/Drg x    Sinus Pressure/Pain x    Loss of smell x alittle   Dental pain     Sore Throat x    Swollen Glands x A week ago   Ear Pain/Fullness     Cough x    Wheeze x    Chest Pain x    Shortness of breath x    Rash     Other       Medications updated and reviewed.  Past, family and surgical history is updated and reviewed in the record.  Patient Active Problem List    Diagnosis Date Noted     Nexplanon insertion 02/08/2016     Priority: Medium     Inserted 2/8/16 lot number [id number] exp 5/18       History reviewed. No pertinent past medical history.   Family History   Problem Relation Age of Onset     Hypertension Maternal Grandmother      Diabetes Maternal Grandfather      Hypertension Maternal Grandfather      Breast Cancer Maternal Grandfather      Cancer Other         osteosarcoma     Melanoma No family hx of      ROS:  Other than noted above, general, HEENT, respiratory, cardiac and gastrointestinal systems are negative.    OBJECTIVE:  GENERAL:  Alert, no acute distress  EYES:  PERRL, EOM normal, conjunctiva and lids normal  HEENT:  Ears and TMs normal, oral mucosa and posterior oropharynx normal  RESP:  POSITIVE exp wheeze bilat LL improved with cough; harsh cough observed.   CV:  Normal rate, regular rhythm, no murmur or gallop.  LYMPHATICS:  No cervical, supraclavicular adenopathy  SKIN:  No suspicious rashes.    Assessment/Plan:     ICD-10-CM    1. Acute bronchitis with symptoms > 10 days J20.9 azithromycin (ZITHROMAX) 250 MG tablet     benzonatate (TESSALON) 100 MG capsule   2. Throat pain R07.0 Strep, Rapid Screen     Beta strep group A culture   3.  Need for prophylactic vaccination and inoculation against influenza Z23 INFLUENZA VACCINE IM > 6 MONTHS VALENT IIV4 [73728]     Vaccine Administration, Initial [[id number]]        See patient instructions: Take full course of antibiotics with daily probiotics or yogurt.  Read below information.  Follow up if symptoms last longer than 10 days or worsen.     CINDY Guzman, FNP-BC

## 2019-11-08 LAB
BACTERIA SPEC CULT: NORMAL
SPECIMEN SOURCE: NORMAL

## 2019-11-20 ENCOUNTER — MYC MEDICAL ADVICE (OUTPATIENT)
Dept: FAMILY MEDICINE | Facility: CLINIC | Age: 24
End: 2019-11-20

## 2020-09-09 ENCOUNTER — E-VISIT (OUTPATIENT)
Dept: FAMILY MEDICINE | Facility: CLINIC | Age: 25
End: 2020-09-09
Payer: COMMERCIAL

## 2020-09-09 DIAGNOSIS — N39.0 ACUTE UTI (URINARY TRACT INFECTION): Primary | ICD-10-CM

## 2020-09-09 PROCEDURE — 99421 OL DIG E/M SVC 5-10 MIN: CPT | Performed by: FAMILY MEDICINE

## 2020-09-09 RX ORDER — NITROFURANTOIN 25; 75 MG/1; MG/1
100 CAPSULE ORAL 2 TIMES DAILY
Qty: 14 CAPSULE | Refills: 0 | Status: SHIPPED | OUTPATIENT
Start: 2020-09-09 | End: 2021-12-14

## 2020-09-09 NOTE — PATIENT INSTRUCTIONS
Thank you for choosing us for your care. I have placed an order for a prescription so that you can start treatment. View your full visit summary for details by clicking on the link below. Your pharmacist will able to address any questions you may have about the medication.     If you re not feeling better within 2-3 days, please schedule an appointment.  You can schedule an appointment right here in [employer], or call [phone]  If the visit is for the same symptoms as your e-visit, we ll refund the cost of your e-visit if seen within seven days.      Urinary Tract Infections in Women    Urinary tract infections (UTIs) are most often caused by bacteria. These bacteria enter the urinary tract. The bacteria may come from outside the body. Or they may travel from the skin outside the rectum or vagina into the urethra. Female anatomy makes it easy for bacteria from the bowel to enter a woman s urinary tract, which is the most common source of UTI. This means women develop UTIs more often than men. Pain in or around the urinary tract is a common UTI symptom. But the only way to know for sure if you have a UTI for the healthcare provider to test your urine. The two tests that may be done are the urinalysis and urine culture.  Types of UTIs    Cystitis. A bladder infection (cystitis) is the most common UTI in women. You may have urgent or frequent urination. You may also have pain, burning when you urinate, and bloody urine.    Urethritis. This is an inflamed urethra, which is the tube that carries urine from the bladder to outside the body. You may have lower stomach or back pain. You may also have urgent or frequent urination.    Pyelonephritis. This is a kidney infection. If not treated, it can be serious and damage your kidneys. In severe cases, you may need to stay in the hospital. You may have a fever and lower back pain.  Medicines to treat a UTI  Most UTIs are treated with antibiotics. These kill the bacteria.  The length of time you need to take them depends on the type of infection. It may be as short as 3 days. If you have repeated UTIs, you may need a low-dose antibiotic for several months. Take antibiotics exactly as directed. Don t stop taking them until all of the medicine is gone. If you stop taking the antibiotic too soon, the infection may not go away. You may also develop a resistance to the antibiotic. This can make it much harder to treat.  Lifestyle changes to treat and prevent UTIs  The lifestyle changes below will help get rid of your UTI. They may also help prevent future UTIs.    Drink plenty of fluids. This includes water, juice, or other caffeine-free drinks. Fluids help flush bacteria out of your body.    Empty your bladder. Always empty your bladder when you feel the urge to urinate. And always urinate before going to sleep. Urine that stays in your bladder can lead to infection. Try to urinate before and after sex as well.    Practice good personal hygiene. Wipe yourself from front to back after using the toilet. This helps keep bacteria from getting into the urethra.    Use condoms during sex. These help prevent UTIs caused by sexually transmitted bacteria. Also don't use spermicides during sex. These can increase the risk for UTIs. Choose other forms of birth control instead. For women who tend to get UTIs after sex, a low-dose of a preventive antibiotic may be used. Be sure to discuss this option with your healthcare provider.    Follow up with your healthcare provider as directed. He or she may test to make sure the infection has cleared. If needed, more treatment may be started.  Date Last Reviewed: 1/1/2017 2000-2019 [employer]. [address] [place] [address], [place] [address]. All rights reserved. This information is not intended as a substitute for professional medical care. Always follow your healthcare professional's instructions.

## 2020-11-29 ENCOUNTER — HEALTH MAINTENANCE LETTER (OUTPATIENT)
Age: 25
End: 2020-11-29

## 2021-04-10 ENCOUNTER — HEALTH MAINTENANCE LETTER (OUTPATIENT)
Age: 26
End: 2021-04-10

## 2021-09-25 ENCOUNTER — HEALTH MAINTENANCE LETTER (OUTPATIENT)
Age: 26
End: 2021-09-25

## 2021-11-15 ENCOUNTER — MYC MEDICAL ADVICE (OUTPATIENT)
Dept: FAMILY MEDICINE | Facility: CLINIC | Age: 26
End: 2021-11-15
Payer: COMMERCIAL

## 2021-12-14 ENCOUNTER — OFFICE VISIT (OUTPATIENT)
Dept: FAMILY MEDICINE | Facility: CLINIC | Age: 26
End: 2021-12-14
Payer: COMMERCIAL

## 2021-12-14 VITALS
HEART RATE: 53 BPM | DIASTOLIC BLOOD PRESSURE: 68 MMHG | OXYGEN SATURATION: 100 % | HEIGHT: 68 IN | TEMPERATURE: 98.4 F | BODY MASS INDEX: 22.43 KG/M2 | WEIGHT: 148 LBS | SYSTOLIC BLOOD PRESSURE: 118 MMHG

## 2021-12-14 DIAGNOSIS — Z00.00 ROUTINE GENERAL MEDICAL EXAMINATION AT A HEALTH CARE FACILITY: Primary | ICD-10-CM

## 2021-12-14 DIAGNOSIS — Z30.011 ENCOUNTER FOR INITIAL PRESCRIPTION OF CONTRACEPTIVE PILLS: ICD-10-CM

## 2021-12-14 DIAGNOSIS — Z23 HIGH PRIORITY FOR 2019-NCOV VACCINE: ICD-10-CM

## 2021-12-14 DIAGNOSIS — Z12.4 SCREENING FOR CERVICAL CANCER: ICD-10-CM

## 2021-12-14 DIAGNOSIS — Z11.3 SCREEN FOR STD (SEXUALLY TRANSMITTED DISEASE): ICD-10-CM

## 2021-12-14 PROCEDURE — 91300 COVID-19,PF,PFIZER (12+ YRS): CPT | Performed by: FAMILY MEDICINE

## 2021-12-14 PROCEDURE — 99395 PREV VISIT EST AGE 18-39: CPT | Mod: 25 | Performed by: FAMILY MEDICINE

## 2021-12-14 PROCEDURE — G0145 SCR C/V CYTO,THINLAYER,RESCR: HCPCS | Performed by: FAMILY MEDICINE

## 2021-12-14 PROCEDURE — 0004A COVID-19,PF,PFIZER (12+ YRS): CPT | Performed by: FAMILY MEDICINE

## 2021-12-14 PROCEDURE — 87591 N.GONORRHOEAE DNA AMP PROB: CPT | Performed by: FAMILY MEDICINE

## 2021-12-14 PROCEDURE — 87491 CHLMYD TRACH DNA AMP PROBE: CPT | Performed by: FAMILY MEDICINE

## 2021-12-14 RX ORDER — NORGESTIMATE AND ETHINYL ESTRADIOL 0.25-0.035
1 KIT ORAL DAILY
Qty: 112 TABLET | Refills: 3 | Status: SHIPPED | OUTPATIENT
Start: 2021-12-14 | End: 2022-03-16 | Stop reason: SINTOL

## 2021-12-14 ASSESSMENT — ENCOUNTER SYMPTOMS
DYSURIA: 0
ABDOMINAL PAIN: 0
JOINT SWELLING: 0
PARESTHESIAS: 0
PALPITATIONS: 0
ARTHRALGIAS: 0
HEMATOCHEZIA: 0
HEMATURIA: 0
CONSTIPATION: 0
HEADACHES: 0
SORE THROAT: 0
NERVOUS/ANXIOUS: 0
SHORTNESS OF BREATH: 0
HEARTBURN: 0
CHILLS: 0
BREAST MASS: 0
WEAKNESS: 0
NAUSEA: 0
DIARRHEA: 0
DIZZINESS: 0
COUGH: 0
MYALGIAS: 0
FREQUENCY: 0
FEVER: 0
EYE PAIN: 0

## 2021-12-14 ASSESSMENT — MIFFLIN-ST. JEOR: SCORE: 1468.79

## 2021-12-14 NOTE — PATIENT INSTRUCTIONS
I sent the prescription for the birth control pill to your pharmacy,.  You can pick that up to have on hand to start once the Nexplanon is removed.  Please just shoot me a message if your have any questions or concerns.     Preventive Health Recommendations  Female Ages 21 to 25     Yearly exam:     See your health care provider every year in order to  o Review health changes.   o Discuss preventive care.    o Review your medicines if your doctor has prescribed any.      You should be tested each year for STDs (sexually transmitted diseases).       Talk to your provider about how often you should have cholesterol testing.      Get a Pap test every three years. If you have an abnormal result, your doctor may have you test more often.      If you are at risk for diabetes, you should have a diabetes test (fasting glucose).     Shots:     Get a flu shot each year.     Get a tetanus shot every 10 years.     Consider getting the shot (vaccine) that prevents cervical cancer (Gardasil).    Nutrition:     Eat at least 5 servings of fruits and vegetables each day.    Eat whole-grain bread, whole-wheat pasta and brown rice instead of white grains and rice.    Get adequate Calcium and Vitamin D.     Lifestyle    Exercise at least 150 minutes a week each week (30 minutes a day, 5 days a week). This will help you control your weight and prevent disease.    Limit alcohol to one drink per day.    No smoking.     Wear sunscreen to prevent skin cancer.    See your dentist every six months for an exam and cleaning.

## 2021-12-14 NOTE — PROGRESS NOTES
SUBJECTIVE:   CC: Rhiannon Landeros is an 26 year old woman who presents for preventive health visit.       Patient has been advised of split billing requirements and indicates understanding: Yes     Healthy Habits:     Getting at least 3 servings of Calcium per day:  Yes    Bi-annual eye exam:  NO    Dental care twice a year:  Yes    Sleep apnea or symptoms of sleep apnea:  None    Diet:  Regular (no restrictions)    Frequency of exercise:  None    Taking medications regularly:  Yes    Medication side effects:  Not applicable    PHQ-2 Total Score: 0    Additional concerns today:  Yes    Concerns:  * birth control options, nexplanon due for removal in 2 months     Interested in something different for contraception.  Menses are very irregular, can be very variable.    Has only ever used Nexplanon for contraception.  This is her 3rd implant.  Thinks she would like OCP.  No h/o migraine with aura.  Prior to using Nexplanon she did have nausea/vomiting with onset of menses.    Today's PHQ-2 Score:   PHQ-2 ( 1999 Pfizer) 12/14/2021   Q1: Little interest or pleasure in doing things 0   Q2: Feeling down, depressed or hopeless 0   PHQ-2 Score 0   PHQ-2 Total Score (12-17 Years)- Positive if 3 or more points; Administer PHQ-A if positive -   Q1: Little interest or pleasure in doing things Not at all   Q2: Feeling down, depressed or hopeless Not at all   PHQ-2 Score 0       Abuse: Current or Past (Physical, Sexual or Emotional) - No  Do you feel safe in your environment? Yes    Have you ever done Advance Care Planning? (For example, a Health Directive, POLST, or a discussion with a medical provider or your loved ones about your wishes):     Social History     Tobacco Use     Smoking status: Never Smoker     Smokeless tobacco: Never Used     Tobacco comment: no tobacco exposure   Substance Use Topics     Alcohol use: Yes     Comment: occasional         Alcohol Use 12/14/2021   Prescreen: >3 drinks/day or >7 drinks/week? No        Reviewed orders with patient.  Reviewed health maintenance and updated orders accordingly - Yes      Breast Cancer Screening:    FHS-7:   Breast CA Risk Assessment (FHS-7) 12/14/2021   Did any of your first-degree relatives have breast or ovarian cancer? Unknown   Did any of your relatives have bilateral breast cancer? Unknown   Did any man in your family have breast cancer? Yes   Did any woman in your family have breast and ovarian cancer? Yes   Did any woman in your family have breast cancer before age 50 y? Unknown   Do you have 2 or more relatives with breast and/or ovarian cancer? Yes   Do you have 2 or more relatives with breast and/or bowel cancer? Unknown       Patient under 40 years of age: Routine Mammogram Screening not recommended.   Pertinent mammograms are reviewed under the imaging tab.    History of abnormal Pap smear: NO - age 21-29 PAP every 3 years recommended  PAP / HPV 12/18/2017   PAP (Historical) NIL     Reviewed and updated as needed this visit by clinical staff  Tobacco  Allergies  Meds   Med Hx  Surg Hx  Fam Hx  Soc Hx       Reviewed and updated as needed this visit by Provider  Tobacco  Allergies  Meds   Med Hx  Surg Hx  Fam Hx  Soc Hx      History reviewed. No pertinent past medical history.   History reviewed. No pertinent surgical history.    Review of Systems   Constitutional: Negative for chills and fever.   HENT: Negative for congestion, ear pain, hearing loss and sore throat.    Eyes: Negative for pain and visual disturbance.   Respiratory: Negative for cough and shortness of breath.    Cardiovascular: Negative for chest pain, palpitations and peripheral edema.   Gastrointestinal: Negative for abdominal pain, constipation, diarrhea, heartburn, hematochezia and nausea.   Breasts:  Negative for tenderness, breast mass and discharge.   Genitourinary: Positive for vaginal bleeding and vaginal discharge. Negative for dysuria, frequency, genital sores, hematuria, pelvic  "pain and urgency.   Musculoskeletal: Negative for arthralgias, joint swelling and myalgias.   Skin: Negative for rash.   Neurological: Negative for dizziness, weakness, headaches and paresthesias.   Psychiatric/Behavioral: Negative for mood changes. The patient is not nervous/anxious.           OBJECTIVE:   /68   Pulse 53   Temp 98.4  F (36.9  C) (Tympanic)   Ht 1.734 m (5' 8.25\")   Wt 67.1 kg (148 lb)   LMP 12/14/2021   SpO2 100%   Breastfeeding No   BMI 22.34 kg/m    Physical Exam  GENERAL: healthy, alert and no distress  EYES: Eyes grossly normal to inspection, PERRL and conjunctivae and sclerae normal  HENT: ear canals and TM's normal, nose and mouth without ulcers or lesions  NECK: no adenopathy, no asymmetry, masses, or scars and thyroid normal to palpation  RESP: lungs clear to auscultation - no rales, rhonchi or wheezes  BREAST: normal without masses, tenderness or nipple discharge and no palpable axillary masses or adenopathy  CV: regular rate and rhythm, normal S1 S2, no S3 or S4, no murmur, click or rub, no peripheral edema and peripheral pulses strong  ABDOMEN: soft, nontender, no hepatosplenomegaly, no masses and bowel sounds normal   (female): normal female external genitalia, normal urethral meatus, vaginal mucosa pink, moist, well rugated, and normal cervix/adnexa/uterus without masses or discharge  MS: no gross musculoskeletal defects noted, no edema  NEURO: Normal strength and tone, mentation intact and speech normal  PSYCH: mentation appears normal, affect normal/bright    Diagnostic Test Results:  Labs reviewed in Epic    ASSESSMENT/PLAN:       ICD-10-CM    1. Routine general medical examination at a health care facility  Z00.00    2. Screening for cervical cancer  Z12.4 Pap screen reflex to HPV if ASCUS - recommend age 25 - 29     HPV Hold (Lab Only)   3. Encounter for initial prescription of contraceptive pills  Z30.011 norgestimate-ethinyl estradiol (ORTHO-CYCLEN) 0.25-35 " "MG-MCG tablet   4. Screen for STD (sexually transmitted disease)  Z11.3 NEISSERIA GONORRHOEA PCR     CHLAMYDIA TRACHOMATIS PCR   5. High priority for 2019-nCoV vaccine  Z23 COVID-19,PF,PFIZER (12+ Yrs PURPLE LABEL)     Reviewed risks, benefits and possible side effects of OCP's.  Reviewed extended cycle.  She will scheduled Nexplanon removal and can begin pill immediately afterwards.  Reviewed importance of condoms for contraception for the first month of use.      Patient has been advised of split billing requirements and indicates understanding: Yes  COUNSELING:  Reviewed preventive health counseling, as reflected in patient instructions    Estimated body mass index is 22.34 kg/m  as calculated from the following:    Height as of this encounter: 1.734 m (5' 8.25\").    Weight as of this encounter: 67.1 kg (148 lb).        She reports that she has never smoked. She has never used smokeless tobacco.      Counseling Resources:  ATP IV Guidelines  Pooled Cohorts Equation Calculator  Breast Cancer Risk Calculator  BRCA-Related Cancer Risk Assessment: FHS-7 Tool  FRAX Risk Assessment  ICSI Preventive Guidelines  Dietary Guidelines for Americans, 2010  USDA's MyPlate  ASA Prophylaxis  Lung CA Screening    Heather Resendiz DO  [facility]  "

## 2021-12-14 NOTE — NURSING NOTE
"Initial /68   Pulse 53   Temp 98.4  F (36.9  C) (Tympanic)   Ht 1.734 m (5' 8.25\")   Wt 67.1 kg (148 lb)   LMP 12/14/2021   SpO2 100%   Breastfeeding No   BMI 22.34 kg/m   Estimated body mass index is 22.34 kg/m  as calculated from the following:    Height as of this encounter: 1.734 m (5' 8.25\").    Weight as of this encounter: 67.1 kg (148 lb). .      "

## 2021-12-15 LAB
C TRACH DNA SPEC QL NAA+PROBE: NEGATIVE
N GONORRHOEA DNA SPEC QL NAA+PROBE: NEGATIVE

## 2021-12-16 LAB
BKR LAB AP GYN ADEQUACY: NORMAL
BKR LAB AP GYN INTERPRETATION: NORMAL
BKR LAB AP HPV REFLEX: NORMAL
BKR LAB AP LMP: NORMAL
BKR LAB AP PREVIOUS ABNORMAL: NORMAL
PATH REPORT.COMMENTS IMP SPEC: NORMAL
PATH REPORT.COMMENTS IMP SPEC: NORMAL
PATH REPORT.RELEVANT HX SPEC: NORMAL

## 2022-01-12 ENCOUNTER — MYC MEDICAL ADVICE (OUTPATIENT)
Dept: FAMILY MEDICINE | Facility: CLINIC | Age: 27
End: 2022-01-12
Payer: COMMERCIAL

## 2022-01-17 ENCOUNTER — OFFICE VISIT (OUTPATIENT)
Dept: FAMILY MEDICINE | Facility: CLINIC | Age: 27
End: 2022-01-17
Payer: COMMERCIAL

## 2022-01-17 VITALS
OXYGEN SATURATION: 98 % | WEIGHT: 148.4 LBS | SYSTOLIC BLOOD PRESSURE: 102 MMHG | HEART RATE: 53 BPM | DIASTOLIC BLOOD PRESSURE: 60 MMHG | RESPIRATION RATE: 16 BRPM | HEIGHT: 69 IN | TEMPERATURE: 98.8 F | BODY MASS INDEX: 21.98 KG/M2

## 2022-01-17 DIAGNOSIS — Z30.46 NEXPLANON REMOVAL: Primary | ICD-10-CM

## 2022-01-17 PROCEDURE — 99207 PR DROP WITH A PROCEDURE: CPT | Performed by: FAMILY MEDICINE

## 2022-01-17 PROCEDURE — 11982 REMOVE DRUG IMPLANT DEVICE: CPT | Performed by: FAMILY MEDICINE

## 2022-01-17 ASSESSMENT — MIFFLIN-ST. JEOR: SCORE: 1482.52

## 2022-01-17 ASSESSMENT — PAIN SCALES - GENERAL: PAINLEVEL: NO PAIN (0)

## 2022-01-18 NOTE — PROGRESS NOTES
Nexplanon procedure removal    Rhiannon Landeros is a 26 year old female presenting to the clinic today for a nexplanon removal.  She is having this removed because it is expiring    She has been advised that the risks would include infection and bleeding as well as minimal risk to surrounding tissue/vessels/nerves.      Consent form was signed.    Patient was laid in the supine position with her left arm extended above her head.  The distal tip of theNexplanon was located.  The area distal that was cleansed with an alcohol swab and numbed with 1 cc of 1% lidocaine with epinephrine.  The area was then cleansed with an iodine swab.  A 7 mm incision was made and the old Nexplanon was removed with some difficulty given its deep location.     Pressure was held on the area of incision.  2 sutures placed.  Steri strips were placed over the small incision.  She will remove this in 10 days.  A bandage was placed over the incision and a pressure dressing was placed over that.    She tolerated the procedure well with no complications.      Follow-up: As needed

## 2022-03-15 ENCOUNTER — MYC MEDICAL ADVICE (OUTPATIENT)
Dept: FAMILY MEDICINE | Facility: CLINIC | Age: 27
End: 2022-03-15
Payer: COMMERCIAL

## 2022-03-15 DIAGNOSIS — Z30.011 ENCOUNTER FOR INITIAL PRESCRIPTION OF CONTRACEPTIVE PILLS: Primary | ICD-10-CM

## 2022-03-16 RX ORDER — DROSPIRENONE AND ETHINYL ESTRADIOL 0.03MG-3MG
1 KIT ORAL DAILY
Qty: 84 TABLET | Refills: 3 | Status: SHIPPED | OUTPATIENT
Start: 2022-03-16 | End: 2022-07-25 | Stop reason: SINTOL

## 2022-07-21 ENCOUNTER — MYC MEDICAL ADVICE (OUTPATIENT)
Dept: FAMILY MEDICINE | Facility: CLINIC | Age: 27
End: 2022-07-21

## 2022-07-21 NOTE — TELEPHONE ENCOUNTER
Call placed to patient   No answer; generic voicemail left requesting call back to Clinic RN at [phone]    Edward Sinha RN

## 2022-07-21 NOTE — TELEPHONE ENCOUNTER
Patient called back and RN was able to assist patient. unfortunately there are no available appointments today or tomorrow with the [facility]. RN advised if wanting to be seen to go to an Urgent Care and be seen.     RN asked if the tampons that she changes are fully saturated each time, which about 80% of the time they are, she is just getting over covid. She denies fatigue and weakness, denies dehydration, is not dizzy or lightheaded. Patient will seek care in . She is taking midol currently and heating pad, not very helpful but a little.     Jessica Bellamy, CLARENCEN, RN, PHN

## 2022-07-25 ENCOUNTER — MYC MEDICAL ADVICE (OUTPATIENT)
Dept: FAMILY MEDICINE | Facility: CLINIC | Age: 27
End: 2022-07-25

## 2022-07-25 DIAGNOSIS — Z30.011 ENCOUNTER FOR INITIAL PRESCRIPTION OF CONTRACEPTIVE PILLS: Primary | ICD-10-CM

## 2022-12-26 ENCOUNTER — HEALTH MAINTENANCE LETTER (OUTPATIENT)
Age: 27
End: 2022-12-26

## 2023-01-06 DIAGNOSIS — Z30.011 ENCOUNTER FOR INITIAL PRESCRIPTION OF CONTRACEPTIVE PILLS: ICD-10-CM

## 2023-01-06 RX ORDER — NORETHINDRONE AND ETHINYL ESTRADIOL 1 MG-35MCG
KIT ORAL
Qty: 84 TABLET | Refills: 0 | Status: SHIPPED | OUTPATIENT
Start: 2023-01-06 | End: 2023-04-06

## 2023-04-06 ENCOUNTER — MYC MEDICAL ADVICE (OUTPATIENT)
Dept: FAMILY MEDICINE | Facility: CLINIC | Age: 28
End: 2023-04-06

## 2023-04-06 ENCOUNTER — OFFICE VISIT (OUTPATIENT)
Dept: FAMILY MEDICINE | Facility: CLINIC | Age: 28
End: 2023-04-06
Payer: COMMERCIAL

## 2023-04-06 VITALS
TEMPERATURE: 99.7 F | RESPIRATION RATE: 20 BRPM | BODY MASS INDEX: 20.62 KG/M2 | HEART RATE: 67 BPM | WEIGHT: 139.2 LBS | DIASTOLIC BLOOD PRESSURE: 84 MMHG | HEIGHT: 69 IN | SYSTOLIC BLOOD PRESSURE: 136 MMHG | OXYGEN SATURATION: 100 %

## 2023-04-06 DIAGNOSIS — Z00.00 ROUTINE GENERAL MEDICAL EXAMINATION AT A HEALTH CARE FACILITY: Primary | ICD-10-CM

## 2023-04-06 DIAGNOSIS — Z11.4 SCREENING FOR HIV (HUMAN IMMUNODEFICIENCY VIRUS): ICD-10-CM

## 2023-04-06 DIAGNOSIS — Z11.3 SCREEN FOR STD (SEXUALLY TRANSMITTED DISEASE): ICD-10-CM

## 2023-04-06 DIAGNOSIS — Z30.011 ENCOUNTER FOR INITIAL PRESCRIPTION OF CONTRACEPTIVE PILLS: ICD-10-CM

## 2023-04-06 DIAGNOSIS — R10.13 EPIGASTRIC PAIN: ICD-10-CM

## 2023-04-06 DIAGNOSIS — R10.2 ADNEXAL PAIN: ICD-10-CM

## 2023-04-06 DIAGNOSIS — Z13.6 CARDIOVASCULAR SCREENING; LDL GOAL LESS THAN 130: ICD-10-CM

## 2023-04-06 LAB
ALBUMIN SERPL BCG-MCNC: 4.5 G/DL (ref 3.5–5.2)
ALP SERPL-CCNC: 50 U/L (ref 35–104)
ALT SERPL W P-5'-P-CCNC: 16 U/L (ref 10–35)
ANION GAP SERPL CALCULATED.3IONS-SCNC: 13 MMOL/L (ref 7–15)
AST SERPL W P-5'-P-CCNC: 17 U/L (ref 10–35)
BASOPHILS # BLD AUTO: 0 10E3/UL (ref 0–0.2)
BASOPHILS NFR BLD AUTO: 0 %
BILIRUB SERPL-MCNC: 1.3 MG/DL
BUN SERPL-MCNC: 11.4 MG/DL (ref 6–20)
C TRACH DNA SPEC QL NAA+PROBE: NEGATIVE
CALCIUM SERPL-MCNC: 9.6 MG/DL (ref 8.6–10)
CHLORIDE SERPL-SCNC: 104 MMOL/L (ref 98–107)
CHOLEST SERPL-MCNC: 229 MG/DL
CREAT SERPL-MCNC: 0.76 MG/DL (ref 0.51–0.95)
DEPRECATED HCO3 PLAS-SCNC: 23 MMOL/L (ref 22–29)
EOSINOPHIL # BLD AUTO: 0.2 10E3/UL (ref 0–0.7)
EOSINOPHIL NFR BLD AUTO: 2 %
ERYTHROCYTE [DISTWIDTH] IN BLOOD BY AUTOMATED COUNT: 13 % (ref 10–15)
GFR SERPL CREATININE-BSD FRML MDRD: >90 ML/MIN/1.73M2
GLUCOSE SERPL-MCNC: 84 MG/DL (ref 70–99)
HCG UR QL: NEGATIVE
HCT VFR BLD AUTO: 39.8 % (ref 35–47)
HDLC SERPL-MCNC: 74 MG/DL
HGB BLD-MCNC: 12.8 G/DL (ref 11.7–15.7)
HIV 1+2 AB+HIV1 P24 AG SERPL QL IA: NONREACTIVE
LDLC SERPL CALC-MCNC: 128 MG/DL
LYMPHOCYTES # BLD AUTO: 2.3 10E3/UL (ref 0.8–5.3)
LYMPHOCYTES NFR BLD AUTO: 34 %
MCH RBC QN AUTO: 28.3 PG (ref 26.5–33)
MCHC RBC AUTO-ENTMCNC: 32.2 G/DL (ref 31.5–36.5)
MCV RBC AUTO: 88 FL (ref 78–100)
MONOCYTES # BLD AUTO: 0.4 10E3/UL (ref 0–1.3)
MONOCYTES NFR BLD AUTO: 6 %
N GONORRHOEA DNA SPEC QL NAA+PROBE: NEGATIVE
NEUTROPHILS # BLD AUTO: 4 10E3/UL (ref 1.6–8.3)
NEUTROPHILS NFR BLD AUTO: 58 %
NONHDLC SERPL-MCNC: 155 MG/DL
PLATELET # BLD AUTO: 339 10E3/UL (ref 150–450)
POTASSIUM SERPL-SCNC: 3.8 MMOL/L (ref 3.4–5.3)
PROT SERPL-MCNC: 8.2 G/DL (ref 6.4–8.3)
RBC # BLD AUTO: 4.52 10E6/UL (ref 3.8–5.2)
SODIUM SERPL-SCNC: 140 MMOL/L (ref 136–145)
T PALLIDUM AB SER QL: NONREACTIVE
TRIGL SERPL-MCNC: 137 MG/DL
WBC # BLD AUTO: 6.9 10E3/UL (ref 4–11)

## 2023-04-06 PROCEDURE — 36415 COLL VENOUS BLD VENIPUNCTURE: CPT | Performed by: FAMILY MEDICINE

## 2023-04-06 PROCEDURE — 80053 COMPREHEN METABOLIC PANEL: CPT | Performed by: FAMILY MEDICINE

## 2023-04-06 PROCEDURE — 87491 CHLMYD TRACH DNA AMP PROBE: CPT | Performed by: FAMILY MEDICINE

## 2023-04-06 PROCEDURE — 90471 IMMUNIZATION ADMIN: CPT | Performed by: FAMILY MEDICINE

## 2023-04-06 PROCEDURE — 99395 PREV VISIT EST AGE 18-39: CPT | Mod: 25 | Performed by: FAMILY MEDICINE

## 2023-04-06 PROCEDURE — 81025 URINE PREGNANCY TEST: CPT | Performed by: FAMILY MEDICINE

## 2023-04-06 PROCEDURE — 87591 N.GONORRHOEAE DNA AMP PROB: CPT | Performed by: FAMILY MEDICINE

## 2023-04-06 PROCEDURE — 99214 OFFICE O/P EST MOD 30 MIN: CPT | Mod: 25 | Performed by: FAMILY MEDICINE

## 2023-04-06 PROCEDURE — 87389 HIV-1 AG W/HIV-1&-2 AB AG IA: CPT | Performed by: FAMILY MEDICINE

## 2023-04-06 PROCEDURE — 90686 IIV4 VACC NO PRSV 0.5 ML IM: CPT | Performed by: FAMILY MEDICINE

## 2023-04-06 PROCEDURE — 86780 TREPONEMA PALLIDUM: CPT | Performed by: FAMILY MEDICINE

## 2023-04-06 PROCEDURE — 80061 LIPID PANEL: CPT | Performed by: FAMILY MEDICINE

## 2023-04-06 PROCEDURE — 85025 COMPLETE CBC W/AUTO DIFF WBC: CPT | Performed by: FAMILY MEDICINE

## 2023-04-06 RX ORDER — NORETHINDRONE AND ETHINYL ESTRADIOL 1 MG-35MCG
1 KIT ORAL DAILY
Qty: 84 TABLET | Refills: 3 | Status: SHIPPED | OUTPATIENT
Start: 2023-04-06 | End: 2024-03-18

## 2023-04-06 RX ORDER — FAMOTIDINE 20 MG/1
20 TABLET, FILM COATED ORAL 2 TIMES DAILY
Qty: 60 TABLET | Refills: 0 | Status: SHIPPED | OUTPATIENT
Start: 2023-04-06 | End: 2023-05-03

## 2023-04-06 ASSESSMENT — ENCOUNTER SYMPTOMS
PARESTHESIAS: 0
HEMATURIA: 0
ARTHRALGIAS: 1
HEARTBURN: 0
DYSURIA: 0
MYALGIAS: 0
ABDOMINAL PAIN: 1
FEVER: 0
NERVOUS/ANXIOUS: 0
SHORTNESS OF BREATH: 0
COUGH: 0
NAUSEA: 0
CONSTIPATION: 1
HEMATOCHEZIA: 0
JOINT SWELLING: 0
BREAST MASS: 0
HEADACHES: 1
PALPITATIONS: 0
DIZZINESS: 0
SORE THROAT: 0
WEAKNESS: 0
DIARRHEA: 0
FREQUENCY: 0
EYE PAIN: 0
CHILLS: 0

## 2023-04-06 ASSESSMENT — PAIN SCALES - GENERAL: PAINLEVEL: NO PAIN (1)

## 2023-04-06 NOTE — PROGRESS NOTES
SUBJECTIVE:   CC: Rhiannon is an 28 year old who presents for preventive health visit.       4/6/2023    10:31 AM   Additional Questions   Roomed by Aracelis ELLIS     Patient has been advised of split billing requirements and indicates understanding: Yes  Healthy Habits:     Getting at least 3 servings of Calcium per day:  Yes    Bi-annual eye exam:  Yes    Dental care twice a year:  Yes    Sleep apnea or symptoms of sleep apnea:  None    Diet:  Regular (no restrictions)    Frequency of exercise:  None    Taking medications regularly:  Yes    Medication side effects:  None    PHQ-2 Total Score: 0    Additional concerns today:  Yes (having on and off stomach pain for about 1-2 weeks, has been experiencing nausea and some loss of appetite.  No OTC medications have been used.   Birth control refill.  )    Medication Followup of Nortrel     Taking Medication as prescribed: yes    Side Effects:  None    Medication Helping Symptoms:  yes    No problems with OCP, no side effects noted.  No missed pills.      Today's PHQ-2 Score:       4/6/2023     7:52 AM   PHQ-2 ( 1999 [employer])   Q1: Little interest or pleasure in doing things 0   Q2: Feeling down, depressed or hopeless 0   PHQ-2 Score 0   Q1: Little interest or pleasure in doing things Not at all    Not at all   Q2: Feeling down, depressed or hopeless Not at all    Not at all   PHQ-2 Score 0    0     Have you ever done Advance Care Planning? (For example, a Health Directive, POLST, or a discussion with a medical provider or your loved ones about your wishes): No, advance care planning information given to patient to review.  Patient declined advance care planning discussion at this time.    Social History     Tobacco Use     Smoking status: Never     Smokeless tobacco: Never     Tobacco comments:     no tobacco exposure   Vaping Use     Vaping status: Never Used     Passive vaping exposure: Yes   Substance Use Topics     Alcohol use: Yes     Comment: rarely             4/6/2023      7:52 AM   Alcohol Use   Prescreen: >3 drinks/day or >7 drinks/week? No     Reviewed orders with patient.  Reviewed health maintenance and updated orders accordingly - Yes      Breast Cancer Screenin/14/2021     4:40 PM 2023     7:53 AM   Breast CA Risk Assessment (FHS-7)   Do you have a family history of breast, colon, or ovarian cancer? Yes No / Unknown         Pertinent mammograms are reviewed under the imaging tab.    History of abnormal Pap smear: NO - age 21-29 PAP every 3 years recommended      2021     5:19 PM 2017    10:08 AM   PAP / HPV   PAP Negative for Intraepithelial Lesion or Malignancy (NILM)      PAP (Historical)  NIL       Reviewed and updated as needed this visit by clinical staff   Tobacco  Allergies  Meds              Reviewed and updated as needed this visit by Provider                 No past medical history on file.   No past surgical history on file.    Review of Systems   Constitutional: Negative for chills and fever.   HENT: Positive for congestion. Negative for ear pain, hearing loss and sore throat.    Eyes: Negative for pain and visual disturbance.   Respiratory: Negative for cough and shortness of breath.    Cardiovascular: Negative for chest pain, palpitations and peripheral edema.   Gastrointestinal: Positive for abdominal pain and constipation. Negative for diarrhea, heartburn, hematochezia and nausea.   Breasts:  Positive for tenderness. Negative for breast mass and discharge.   Genitourinary: Positive for vaginal discharge. Negative for dysuria, frequency, genital sores, hematuria, pelvic pain, urgency and vaginal bleeding.   Musculoskeletal: Positive for arthralgias. Negative for joint swelling and myalgias.   Skin: Positive for rash.   Neurological: Positive for headaches. Negative for dizziness, weakness and paresthesias.   Psychiatric/Behavioral: Negative for mood changes. The patient is not nervous/anxious.      Having some abdominal symptoms  "for the last few weeks.  Mostly just feels uncomfortable.  Last week Thursday and Friday had sharp pains in the RLQ and in the R back.  Those sharp pains have resolve but continues to have some discomfort in the RLQ.  Epigastric area is uncomfortable at time when pressed, like when laying on her stomach.    Has been pretty consistent since onset with the exception of worse last week as noted above.  Has been nauseated and sometimes does not eat due to nausea.  This has been sporadic.  Mostly eating normally.  No vomiting.  BM's were initially normal, now perhaps less frequent.  Stooling once or twice per day.  No blood in the stool    No dysuria/hematuria or increased frequency.      LMP in March sometime.  Some months does not get a period and some months bleeds at the end of the placebo week.   Is currently sexually active.          OBJECTIVE:   /84   Pulse 67   Temp 99.7  F (37.6  C) (Tympanic)   Resp 20   Ht 1.753 m (5' 9\")   Wt 63.1 kg (139 lb 3.2 oz)   LMP  (LMP Unknown)   SpO2 100%   BMI 20.56 kg/m    Physical Exam  GENERAL: healthy, alert and no distress  EYES: Eyes grossly normal to inspection, PERRL and conjunctivae and sclerae normal  NECK: no adenopathy, no asymmetry, masses, or scars and thyroid normal to palpation  RESP: lungs clear to auscultation - no rales, rhonchi or wheezes  CV: regular rate and rhythm, normal S1 S2, no S3 or S4, no murmur, click or rub, no peripheral edema and peripheral pulses strong  ABDOMEN: soft, mild epigastric and R abdominal tenderness, no hepatosplenomegaly, no masses and bowel sounds normal   (female): normal female external genitalia, normal urethral meatus, vaginal mucosa pink, moist, well rugated, and normal cervix/adnexa/uterus without masses or discharge, R adnexal tenderness without mass noted.  MS: no gross musculoskeletal defects noted, no edema  NEURO: Normal strength and tone, mentation intact and speech normal  PSYCH: mentation appears normal, " affect normal/bright    Diagnostic Test Results:  Labs reviewed in Epic    ASSESSMENT/PLAN:       ICD-10-CM    1. Routine general medical examination at a health care facility  Z00.00       2. Adnexal pain  R10.2 HCG Qual, Urine ([id number])     US Pelvic Complete with Transvaginal     CBC with platelets and differential     HCG Qual, Urine ([id number])     CBC with platelets and differential      3. Epigastric pain  R10.13 famotidine (PEPCID) 20 MG tablet     Comprehensive metabolic panel (BMP + Alb, Alk Phos, ALT, AST, Total. Bili, TP)     Comprehensive metabolic panel (BMP + Alb, Alk Phos, ALT, AST, Total. Bili, TP)      4. Encounter for initial prescription of contraceptive pills  Z30.011 norethindrone-ethinyl estradiol (NORTREL 1/35, 28,) 1-35 MG-MCG tablet      5. Screening for HIV (human immunodeficiency virus)  Z11.4 HIV Antigen Antibody Combo     HIV Antigen Antibody Combo      6. Screen for STD (sexually transmitted disease)  Z11.3 Treponema Abs w Reflex to RPR and Titer     NEISSERIA GONORRHOEA PCR     CHLAMYDIA TRACHOMATIS PCR     Treponema Abs w Reflex to RPR and Titer      7. CARDIOVASCULAR SCREENING; LDL GOAL LESS THAN 130  Z13.6 Lipid panel reflex to direct LDL Fasting     Lipid panel reflex to direct LDL Fasting        Unclear etiology of abd pain.  CBC wnl, HCG negative, tender in the entire RLQ including adnexa on pelvic exam.  Plan pelvic ultrasound as next step to r/o adnexal pathology.  Plan beginning antiacid regimen for epigastric symptoms.  Reviewed reasons to seek emergent care for worsening symptoms.   F/u next week via [employer] with update on symptoms.    Patient has been advised of split billing requirements and indicates understanding: Yes      COUNSELING:  Reviewed preventive health counseling, as reflected in patient instructions        She reports that she has never smoked. She has never used smokeless tobacco.          Heather Resendiz [facility]

## 2023-04-06 NOTE — PATIENT INSTRUCTIONS
For the low right sided pain:  please call to schedule the pelvic ultrasound to evaluate the ovary as a possible source for your pain.  Make sure to go in to the ER if the pain becomes severe or you develop a fever    For the upper abdominal pain:  we'll begin the pepcid twice daily.  Please update me next week how you are feeling.    I'll let you know the lab results when they are available.    Preventive Health Recommendations  Female Ages 26 - 39  Yearly exam:   See your health care provider every year in order to  Review health changes.   Discuss preventive care.    Review your medicines if you your doctor has prescribed any.    Until age 30: Get a Pap test every three years (more often if you have had an abnormal result).    After age 30: Talk to your doctor about whether you should have a Pap test every 3 years or have a Pap test with HPV screening every 5 years.   You do not need a Pap test if your uterus was removed (hysterectomy) and you have not had cancer.  You should be tested each year for STDs (sexually transmitted diseases), if you're at risk.   Talk to your provider about how often to have your cholesterol checked.  If you are at risk for diabetes, you should have a diabetes test (fasting glucose).  Shots: Get a flu shot each year. Get a tetanus shot every 10 years.   Nutrition:   Eat at least 5 servings of fruits and vegetables each day.  Eat whole-grain bread, whole-wheat pasta and brown rice instead of white grains and rice.  Get adequate Calcium and Vitamin D.     Lifestyle  Exercise at least 150 minutes a week (30 minutes a day, 5 days of the week). This will help you control your weight and prevent disease.  Limit alcohol to one drink per day.  No smoking.   Wear sunscreen to prevent skin cancer.  See your dentist every six months for an exam and cleaning.

## 2023-04-07 ENCOUNTER — ANCILLARY PROCEDURE (OUTPATIENT)
Dept: ULTRASOUND IMAGING | Facility: CLINIC | Age: 28
End: 2023-04-07
Attending: FAMILY MEDICINE
Payer: COMMERCIAL

## 2023-04-07 DIAGNOSIS — R10.2 ADNEXAL PAIN: ICD-10-CM

## 2023-04-07 PROCEDURE — 76856 US EXAM PELVIC COMPLETE: CPT | Performed by: RADIOLOGY

## 2023-04-07 PROCEDURE — 76830 TRANSVAGINAL US NON-OB: CPT | Performed by: RADIOLOGY

## 2023-04-10 ENCOUNTER — MYC MEDICAL ADVICE (OUTPATIENT)
Dept: FAMILY MEDICINE | Facility: CLINIC | Age: 28
End: 2023-04-10
Payer: COMMERCIAL

## 2023-04-10 DIAGNOSIS — R10.31 RLQ ABDOMINAL PAIN: ICD-10-CM

## 2023-04-10 DIAGNOSIS — R10.2 ADNEXAL PAIN: Primary | ICD-10-CM

## 2023-04-11 DIAGNOSIS — Z30.011 ENCOUNTER FOR INITIAL PRESCRIPTION OF CONTRACEPTIVE PILLS: ICD-10-CM

## 2023-04-11 RX ORDER — NORETHINDRONE AND ETHINYL ESTRADIOL 1 MG-35MCG
1 KIT ORAL DAILY
Qty: 84 TABLET | Refills: 3 | OUTPATIENT
Start: 2023-04-11

## 2023-04-19 ENCOUNTER — TELEPHONE (OUTPATIENT)
Dept: FAMILY MEDICINE | Facility: CLINIC | Age: 28
End: 2023-04-19

## 2023-04-19 ENCOUNTER — ANCILLARY PROCEDURE (OUTPATIENT)
Dept: CT IMAGING | Facility: CLINIC | Age: 28
End: 2023-04-19
Attending: FAMILY MEDICINE
Payer: COMMERCIAL

## 2023-04-19 DIAGNOSIS — R10.31 RLQ ABDOMINAL PAIN: ICD-10-CM

## 2023-04-19 DIAGNOSIS — R10.2 ADNEXAL PAIN: ICD-10-CM

## 2023-04-19 PROCEDURE — 74177 CT ABD & PELVIS W/CONTRAST: CPT | Performed by: RADIOLOGY

## 2023-04-19 RX ORDER — IOPAMIDOL 755 MG/ML
77 INJECTION, SOLUTION INTRAVASCULAR ONCE
Status: COMPLETED | OUTPATIENT
Start: 2023-04-19 | End: 2023-04-19

## 2023-04-19 RX ADMIN — IOPAMIDOL 77 ML: 755 INJECTION, SOLUTION INTRAVASCULAR at 17:48

## 2023-04-19 NOTE — TELEPHONE ENCOUNTER
I spoke with the insurance who stated the procedure was denied because they did not receive the medical records requested within 24 hours of their fax on Monday 4/17.    We now need to begin a second level appeal.  I was told that medical records need to be faxed to [phone] for an expedited review.  This should include my office visit with pt, all lab results from that day and the ultrasound results.    Pt's information, member number and case number should be included.    The expedited review phone number is [phone]    Please fax the necessary information and call to confirm receipt.  If the approval is not received by end of day I would recommend pt cancel the scan and reschedule after approval is obtained.    Please let me know if there is more that is needed.    Dr. Heather Resendiz, DO

## 2023-04-19 NOTE — TELEPHONE ENCOUNTER
Jair Resendiz,    This is a peer to peer request for CT AP scan. The insurance is requesting this to better understand the reason why the test is being ordered. This peer to peer needs to be completed on or before 04/19/2023, end of the day if possible. The patient is scheduled this afternoon.    Please call the insurance company and reference the following information:    Payor phone number: [phone]    Case number: [id number]    Patient ID:     Reason why it was denied:        I can assure you that the most recent office note from [facility] 04/06/2023 and the US results from DOS 4/7/2023 were sent in for review initially prior to the denial.      Requesting response of outcome back to FC's on approval/denial with the following information:   o auth#  o date range  o who they spoke to     If you have any further questions please feel free to reach out to me. Thank you for your attention on this.       Lisa Purdy  Senior Intake Financial Counselor  [facility]  [address] [address] [place] [id number]  Ph: [phone]  Fax: [phone]

## 2023-05-02 ENCOUNTER — VIRTUAL VISIT (OUTPATIENT)
Dept: FAMILY MEDICINE | Facility: CLINIC | Age: 28
End: 2023-05-02
Attending: FAMILY MEDICINE
Payer: COMMERCIAL

## 2023-05-02 DIAGNOSIS — R10.2 ADNEXAL PAIN: Primary | ICD-10-CM

## 2023-05-02 PROCEDURE — 99213 OFFICE O/P EST LOW 20 MIN: CPT | Mod: 95 | Performed by: FAMILY MEDICINE

## 2023-05-02 NOTE — PROGRESS NOTES
Rhiannon is a 28 year old who is being evaluated via a billable telephone visit.      What phone number would you like to be contacted at? [phone]  How would you like to obtain your AVS? Anay  Distant Location (provider location):  On-site    A/P:      ICD-10-CM    1. Adnexal pain  R10.2 Ob/Gyn Referral        Reviewed options.  Pt is interested in pursuing GYN referral.  Referral placed.  She will continue OCP and antacid as epigastric pain has resolved.  Encouraged to seek emergent care for worsrning symptoms        Subjective   Rhiannon is a 28 year old, presenting for the following health issues:  RECHECK        4/6/2023    10:31 AM   Additional Questions   Roomed by Aracelis MILLER     * follow up on abdominal pain    Started antiacid medication at the alt visit which did help the upper abdominal pain    Still having more lower/pelvic pain.  Has not had any days with severe pain.  Intermittent.  has days with no pain but other days when she notices it like a period cramp.  Feels she notices it 4/7 days per week.  Can be present all day, sometimes it comes and goes.     Same as it was previously but not longer having the more severe stabbing pain episodes.      Pain is mostly lower R abdomen, radiated to the L a bit but not as bad.  This lower pain began in mid-March.    Has not noted any triggering factors.  No change with eating or BM.  Has tried heat with no relief.  Has also tried ibuprofen with decreases pain.    Able to do her normal activities with the pain.      Takes her pill with monthly beaks.  Did not get a period in April.        Review of Systems         Objective           Vitals:  No vitals were obtained today due to virtual visit.    Physical Exam   healthy, alert and no distress  PSYCH: Alert and oriented times 3; coherent speech, normal   rate and volume, able to articulate logical thoughts, able   to abstract reason, no tangential thoughts, no hallucinations   or delusions  Her affect is  normal  RESP: No cough, no audible wheezing, able to talk in full sentences  Remainder of exam unable to be completed due to telephone visits    EPic reviewed    Reviewed pelvic ultrasound which was normal and CT abd/pelvis which discussed possible congestion of uterus.adnexa            Phone call duration: 13 minutes

## 2023-05-03 DIAGNOSIS — R10.13 EPIGASTRIC PAIN: ICD-10-CM

## 2023-05-03 RX ORDER — FAMOTIDINE 20 MG/1
20 TABLET, FILM COATED ORAL 2 TIMES DAILY PRN
Qty: 60 TABLET | Refills: 0 | Status: SHIPPED | OUTPATIENT
Start: 2023-05-03

## 2023-05-03 NOTE — TELEPHONE ENCOUNTER
Routing refill request to provider for review/approval because:  PCP to determine refill    Edward Sinha RN

## 2023-07-20 NOTE — PATIENT INSTRUCTIONS
If you have any questions regarding your visit, Please contact your care team.     [facility]: [phone]  To Schedule an Appointment 24/7  Call: [phone] HOU[facility] TELEPHONE NUMBER   Christianne Lesli, DNP, APRN, NP-BC    Jenny - Surgery Scheduler  Corinne - Surgery Scheduler    REGINALDO Guerrero, REGINLADO Anders RN   Monday- [place]  8:00 am - 12:00 pm    Tuesday- [place]  8:00 am - 4:30 pm    Wednesday- [place]  8:00 am - 4:30 pm    Thursday- [place]  8:00 am - 4:30 pm    Friday- [place]  8:00 am - 4:30 pm [facility]  [address] [address] [address]  Phone: [phone]   Fax: [phone]     Imaging Scheduling-All Locations [phone]    [facility]  [address] [place] [address]     Urgent Care locations:  [facility] Monday-Friday   10 am - 8 pm  Saturday and Sunday   9 am - 5 pm [phone]   [facility] Labor and Delivery:  [phone]    If you need a medication refill, please contact your pharmacy. Please allow 3 business days for your refill to be completed.  As always, Thank you for trusting us with your healthcare needs!  see additional instructions from your care team below

## 2023-07-21 ENCOUNTER — OFFICE VISIT (OUTPATIENT)
Dept: OBGYN | Facility: CLINIC | Age: 28
End: 2023-07-21
Payer: COMMERCIAL

## 2023-07-21 VITALS
OXYGEN SATURATION: 100 % | SYSTOLIC BLOOD PRESSURE: 142 MMHG | BODY MASS INDEX: 20.38 KG/M2 | HEART RATE: 65 BPM | DIASTOLIC BLOOD PRESSURE: 87 MMHG | WEIGHT: 138 LBS

## 2023-07-21 DIAGNOSIS — R10.2 PELVIC PAIN IN FEMALE: Primary | ICD-10-CM

## 2023-07-21 DIAGNOSIS — R19.7 DIARRHEA, UNSPECIFIED TYPE: ICD-10-CM

## 2023-07-21 PROCEDURE — 99202 OFFICE O/P NEW SF 15 MIN: CPT

## 2023-07-21 NOTE — PROGRESS NOTES
"  Assessment & Plan     (R10.2) Pelvic pain in female  (primary encounter diagnosis)  (R19.7) Diarrhea, unspecified type  Comment: Pelvic pain, predominantly to RLQ but occassionally to LLQ, in a 28 year old  female. Subsequent development of mushy and liquidy greenish brown stools and persistent heartburn. Pelvic US unremarkable. Possibility of endometriosis discussed but first line management for this would be DONNA which she is currently using (notable that pain developed during DONNA use). Considering GI symptoms we made plan for a GI evaluation and if GI workup is negative she should consider return to GYN for discussion of further evaluation for endometriosis.  Plan: Adult GI  Referral - Consult Only    Total of 29 minutes spent on the date of the encounter doing chart review, history and exam, documentation, patient visit and exam, and further activities as noted above.      CINDY Santos [facility]  Plan to have her evaluated by GI to rule out GI cause of pain. If GI evaluation is negative patient is encouraged to return to OBGYN for possible endometriosis evaluation/discussion. She is currently on DONNA, the first liine treatment for endometriosis and interestingly her pain did no start until after being on DONNA for some time.  Carito Jurado is a 28 year old, presenting for the following health issues:  Pelvic Pain    PAUL Jurado is a 28 year old  who is here today for elvuation of pelvic pain.  Pain began in 2023.  Pain is described as \"sore and annoying,\" \"more of a discomfort\" and is located in the RLQ.   This pain is present nearly all of the time but occasionally it becomes sharp and radiates down her right anterior leg into her thigh. She states that when she has these really sharp pain she sometimes feels lightheaded and needs to sit. Sitting seems to relive the pain some.  Occassionaly this discomfort has occured to her LLQ but " "most often on the right. The LLQ pain began sometime after the RLQ pain started.   This pain has been accompanied by nausea and lack of appetite.  She denies fever, N/V or chills.  She denies surgical hx or uterine instrumentation.  STI screening panel and pregnancy testing negative.    Bowels were initially normal, however sometime in May her bowel movements changed from firm, brown stool to \"mushy, sometimes liquid\" \"greenish brown\" stools that occur at least daily, sometimes a couple of time daily.   She also reports heavy pelvic cramping just prior to defecating.     She also notes that in march she also developed new onset,daily heartburn. She has been taking OTC pepcid for this with some improvement.    Denies changes in her urine.  She has not travelled out of [place].    Menses are very regular on DONNA  April 2022 she switched from Nexplanon to DONNA and since then her periods have been very regular and with moderate and flow lasts 4 days.  She is sexually active but uses DONNA consistently. Home pregnancy tests have been negative.      Review of Systems 10 point review of systems completed with no abnormals except as noted in HPI.        Objective    BP (!) 142/87 (BP Location: Right arm, Cuff Size: Adult Regular)   Pulse 65   Wt 62.6 kg (138 lb)   LMP 07/06/2023 (Approximate)   SpO2 100%   BMI 20.38 kg/m    Body mass index is 20.38 kg/m .  Physical Exam   GENERAL: healthy, alert and no distress  EYES: Eyes grossly normal to inspection, PERRL and conjunctivae and sclerae normal  NECK: no adenopathy, no asymmetry, masses, or scars and thyroid normal to palpation  RESP: lungs clear to auscultation - no rales, rhonchi or wheezes  CV: regular rate and rhythm, normal S1 S2, no S3 or S4, no murmur, click or rub, no peripheral edema and peripheral pulses strong  ABDOMEN: soft, nontender, no hepatosplenomegaly, no masses and bowel sounds normal   (female): normal female external genitalia, normal urethral meatus, " vaginal mucosa, normal cervix/adnexa/uterus without masses or discharge POSITIVE for mild tenderness to RLQ with bimanual exam  MS: no gross musculoskeletal defects noted, no edema  NEURO: Normal strength and tone, mentation intact and speech normal  BACK: no CVA tenderness, no paralumbar tenderness  PSYCH: mentation appears normal, affect normal/bright  LYMPH: no cervical, supraclavicular, axillary, or inguinal adenopathy

## 2023-08-09 ENCOUNTER — MYC MEDICAL ADVICE (OUTPATIENT)
Dept: GASTROENTEROLOGY | Facility: CLINIC | Age: 28
End: 2023-08-09
Payer: COMMERCIAL

## 2023-08-16 ENCOUNTER — MYC MEDICAL ADVICE (OUTPATIENT)
Dept: GASTROENTEROLOGY | Facility: CLINIC | Age: 28
End: 2023-08-16
Payer: COMMERCIAL

## 2023-09-11 ENCOUNTER — OFFICE VISIT (OUTPATIENT)
Dept: GASTROENTEROLOGY | Facility: CLINIC | Age: 28
End: 2023-09-11
Payer: COMMERCIAL

## 2023-09-11 VITALS
HEIGHT: 69 IN | BODY MASS INDEX: 20.59 KG/M2 | OXYGEN SATURATION: 98 % | SYSTOLIC BLOOD PRESSURE: 126 MMHG | DIASTOLIC BLOOD PRESSURE: 78 MMHG | WEIGHT: 139 LBS | HEART RATE: 75 BPM

## 2023-09-11 DIAGNOSIS — R10.2 PELVIC PAIN IN FEMALE: ICD-10-CM

## 2023-09-11 DIAGNOSIS — R10.31 RLQ ABDOMINAL PAIN: Primary | ICD-10-CM

## 2023-09-11 DIAGNOSIS — R19.7 DIARRHEA, UNSPECIFIED TYPE: ICD-10-CM

## 2023-09-11 PROCEDURE — 99204 OFFICE O/P NEW MOD 45 MIN: CPT | Performed by: NURSE PRACTITIONER

## 2023-09-11 RX ORDER — DICYCLOMINE HYDROCHLORIDE 10 MG/1
10 CAPSULE ORAL 2 TIMES DAILY PRN
Qty: 30 CAPSULE | Refills: 0 | Status: SHIPPED | OUTPATIENT
Start: 2023-09-11 | End: 2024-01-18

## 2023-09-11 RX ORDER — CHOLESTYRAMINE 4 G/9G
1 POWDER, FOR SUSPENSION ORAL 2 TIMES DAILY WITH MEALS
Qty: 30 PACKET | Refills: 0 | Status: SHIPPED | OUTPATIENT
Start: 2023-09-11 | End: 2023-09-19

## 2023-09-11 ASSESSMENT — PAIN SCALES - GENERAL: PAINLEVEL: MILD PAIN (2)

## 2023-09-11 NOTE — LETTER
9/11/2023         RE: Rhiannon Landeros  [address] [place] [address]        Dear Colleague,    Thank you for referring your patient, Rhiannon Landeros, to the [facility]. Please see a copy of my visit note below.    Gastroenterology CLINIC VISIT, NEW PATIENT    CC/REFERRING PROVIDER: Christianne Vanegas  REASON FOR CONSULTATION: diarrhea, pelvic pain    HPI: 28 year old female was referred  from GYN provider to GI clinic for consult on lower abdominal pain since about 4 months ago. She cannot recall any preceding events- no traveling, no changes in diet, no new medications, and no antibiotic use. Pain is located in right lower quadrant and radiates to groin. Sometimes, she has pain in the LLQ. Worse before defecation. This morning, she woke up from pain in the RLQ after rolling in bed. Had sharp pain for a few minutes. Feels sore now.  She complains of changes in bowel habits for few months. Used to have formed stools daily. Now, she has days of mushy and loose stools, 4-5 times a day.  Reported associated urgency of stools. Stools colors are greenish. Complains of flatulence. No significant bloating. No emesis. Occasionally, she has nausea. No weight loss. Appetite is good.    PREVIOUS ENDOSCOPY:  None    PERTINENT STUDIES Reviewed in EMR  4/19/23 CT scan of abdomen  FINDINGS: The included lower chest appears unremarkable.  Standard portal venous phase postcontrast imaging of the abdomen and  pelvis shows normal appearance of the liver, gallbladder, spleen,  pancreas, biliary tree the right kidney. A subcentimeter cortical  anterior left renal hypodensity at the midpole measures approximately  2 mm too small to definitively characterize but statistically is most  likely representing a tiny cyst. Adrenals appear normal.  Major aortoiliac vessels and their branches appear grossly  unremarkable. No bowel masses or distention.  Urinary bladder decompressed. Uterus somewhat prominent  in size.  Possibly a prominent left ovarian cystic area measuring up to 1.8 cm  in maximum dimension noted. Difficult to separate bowel from the right  adnexa. Cannot exclude right adnexal prominence. No free fluid. No  free air. No inflammatory changes.  Bone detail shows good mineralization throughout without suspicious  sclerotic or lytic/destructive lesion.                                                                    IMPRESSION: Perhaps mildly prominent adnexal structures bilaterally.  Given recent normal pelvic ultrasound, follow-up pelvic ultrasound an  approximately 4 weeks may be helpful. Pelvic MRI could be considered  for better spatial resolution. 2 mm possible left renal cyst.    ROS: 10pt ROS performed and otherwise negative.    PAST MEDICAL HISTORY:  No past medical history on file.    PREVIOUS ABDOMINAL/GYNECOLOGIC SURGERIES:    Past Surgical History:   Procedure Laterality Date     WISDOM TOOTH EXTRACTION Bilateral          PERTINENT MEDICATIONS:  Current Outpatient Medications   Medication Sig Dispense Refill     famotidine (PEPCID) 20 MG tablet Take 1 tablet (20 mg) by mouth 2 times daily as needed (heartburn) 60 tablet 0     norethindrone-ethinyl estradiol (NORTREL 1/35, 28,) 1-35 MG-MCG tablet Take 1 tablet by mouth daily 84 tablet 3         SOCIAL HISTORY:  Social History     Socioeconomic History     Marital status: Single     Spouse name: Not on file     Number of children: Not on file     Years of education: Not on file     Highest education level: Not on file   Occupational History     Not on file   Tobacco Use     Smoking status: Never     Smokeless tobacco: Never     Tobacco comments:     no tobacco exposure   Vaping Use     Vaping Use: Never used   Substance and Sexual Activity     Alcohol use: Yes     Comment: rarely     Drug use: No     Sexual activity: Yes     Partners: Male     Birth control/protection: Pill   Other Topics Concern     Parent/sibling w/ CABG, MI or angioplasty  before 65F 55M? Not Asked   Social History Narrative     Not on file     Social Determinants of Health     Financial Resource Strain: Not on file   Food Insecurity: Not on file   Transportation Needs: Not on file   Physical Activity: Not on file   Stress: Not on file   Social Connections: Not on file   Intimate Partner Violence: Not on file   Housing Stability: Not on file       FAMILY HISTORY:  Denies colon/panc/esophageal/other GI CA, no other Garcia or other HPS-related Drew. No IBD/celiac, no other AI/liver/thyroid disease.    Family History   Problem Relation Age of Onset     Hypertension Maternal Grandmother      Diabetes Maternal Grandfather      Hypertension Maternal Grandfather      Breast Cancer Maternal Grandfather      Cancer Other         osteosarcoma     Melanoma No family hx of        PHYSICAL EXAMINATION:  Vitals reviewed  LMP 07/06/2023 (Approximate)     General: Patient appears well in no acute distress.    Skin: No visualized rash or lesions on visualized skin  HEENT:    EOMI, no erythema, sclera icterus or discharge noted.  Mouth mucosa intact, pink, moist  No cervical or supraclavicular lymphadenopathy. Thyroid gland not enlarged.  Resp: breathing comfortably without accessory muscle usage, speaking in full sentences, no cough. Lung sounds clear  Card: Regular and rhythmic S1 and S2. No gallop or rub. Soft 1/6 heart murmur at the right upper sternal borer.  No LE edema.  Abdomen: Active bowel sounds X 4 quadrants. Soft to palpation. Tenderness in the RLQ and LLQ.  No guarding or rebound tenderness. Jim's sign negative.  MSK: Appears to have normal range of motion based on visualized movements  Neurologic: No apparent tremors, facial movements symmetric  Psych: affect normal, alert and oriented      ASSESSMENT/PLAN:    ICD-10-CM    1. RLQ abdominal pain  R10.31 Enteric Bacteria and Virus Panel by KAYLIE Stool     Ova and Parasite Exam Routine     Calprotectin Feces     cholestyramine (QUESTRAN) 4 g  packet     dicyclomine (BENTYL) 10 MG capsule     Enteric Bacteria and Virus Panel by KAYLIE Stool     Ova and Parasite Exam Routine     Calprotectin Feces      2. Diarrhea, unspecified type  R19.7 Adult GI  Referral - Consult Only     Enteric Bacteria and Virus Panel by KAYLIE Stool     Ova and Parasite Exam Routine     Calprotectin Feces     cholestyramine (QUESTRAN) 4 g packet     Enteric Bacteria and Virus Panel by KAYLIE Stool     Ova and Parasite Exam Routine     Calprotectin Feces      3. Pelvic pain in female  R10.2 Adult GI  Referral - Consult Only         28 year old female  presented  to GI clinic for evaluation of lower abdominal pain for about 6 months. Cannot recall any preceding events or name triggers of her pain. Complains of changes in her bowel habits to mushy and loose stools on some days, which alternate with formed stools. She was watching her diet, but couldn't identify any correlation between certain types of food and her symptoms. Patient said that she has nausea once in a while. Back in May, she started having acid reflux. Was seen at the ER on 6/2 for mid-chest pain. Started famotidine and found it effective.   We discussed differential diagnosis including but not limited to IBS, infection, malabsorption, side effect of medication,  pathology, and least likely, IBD. Noted persistent mild elevation in bilirubin, likely benign etiology (Gilbert syndrome?).  Noted normal liver, gallbladder, spleen,pancreas,and biliary tree on CT scan in April. There was a questionable prominent left ovarian cystic area measuring up to 1.8 cm  in maximum dimension. Difficult to separate bowel from the right adnexa. Cannot exclude right adnexal prominence. No free fluid. No free air. No inflammatory changes.  Patient was recently seen and evaluated by gynecology. Possibility of endometriosis discussed but the patient is already on oral contraceptives, which is the first line treatment, and is still  having pain in the abdomen. Was sent to GI to exclude other pathologies.    Suggested to complete stool studies and try symptomatic management.  A trial of cholestyramine bid was ordered. Start with 2 gram at supper for one week. Then, increase to 2 gram twice a day with meals.  Use dicyclomine as needed for abdominal cramping or pain.  May want to try increasing fiber in her diet. Educated on elimination diet, particularly focusing on lactose, spicy foods, artificial sweeteners, coffee,and honey.  Briefly discussed an option of screening colonoscopy if symptoms persist.     Patient verbalized understanding and appreciation of care provided. Stated that all of the questions were answered to her/his satisfaction.  RTC in one month    Thank you for this consultation. It was a pleasure to participate in the care of this patient; please contact us with any further questions.    CINDY Albarran, AVNIP-C  [facility]  Gastroenterology Department  [place], [place]    This note was created with Dragon voice recognition software, and while reviewed for accuracy, inadvertent minor typographic errors may occur. Please contact the provider if you have any questions.       Again, thank you for allowing me to participate in the care of your patient.        Sincerely,        CINDY ALBARRAN CNP

## 2023-09-11 NOTE — PROGRESS NOTES
Gastroenterology CLINIC VISIT, NEW PATIENT    CC/REFERRING PROVIDER: Christianne Vanegas  REASON FOR CONSULTATION: diarrhea, pelvic pain    HPI: 28 year old female was referred  from GYN provider to GI clinic for consult on lower abdominal pain since about 4 months ago. She cannot recall any preceding events- no traveling, no changes in diet, no new medications, and no antibiotic use. Pain is located in right lower quadrant and radiates to groin. Sometimes, she has pain in the LLQ. Worse before defecation. This morning, she woke up from pain in the RLQ after rolling in bed. Had sharp pain for a few minutes. Feels sore now.  She complains of changes in bowel habits for few months. Used to have formed stools daily. Now, she has days of mushy and loose stools, 4-5 times a day.  Reported associated urgency of stools. Stools colors are greenish. Complains of flatulence. No significant bloating. No emesis. Occasionally, she has nausea. No weight loss. Appetite is good.    PREVIOUS ENDOSCOPY:  None    PERTINENT STUDIES Reviewed in EMR  4/19/23 CT scan of abdomen  FINDINGS: The included lower chest appears unremarkable.  Standard portal venous phase postcontrast imaging of the abdomen and  pelvis shows normal appearance of the liver, gallbladder, spleen,  pancreas, biliary tree the right kidney. A subcentimeter cortical  anterior left renal hypodensity at the midpole measures approximately  2 mm too small to definitively characterize but statistically is most  likely representing a tiny cyst. Adrenals appear normal.  Major aortoiliac vessels and their branches appear grossly  unremarkable. No bowel masses or distention.  Urinary bladder decompressed. Uterus somewhat prominent in size.  Possibly a prominent left ovarian cystic area measuring up to 1.8 cm  in maximum dimension noted. Difficult to separate bowel from the right  adnexa. Cannot exclude right adnexal prominence. No free fluid. No  free air. No inflammatory  changes.  Bone detail shows good mineralization throughout without suspicious  sclerotic or lytic/destructive lesion.                                                                    IMPRESSION: Perhaps mildly prominent adnexal structures bilaterally.  Given recent normal pelvic ultrasound, follow-up pelvic ultrasound an  approximately 4 weeks may be helpful. Pelvic MRI could be considered  for better spatial resolution. 2 mm possible left renal cyst.    ROS: 10pt ROS performed and otherwise negative.    PAST MEDICAL HISTORY:  No past medical history on file.    PREVIOUS ABDOMINAL/GYNECOLOGIC SURGERIES:    Past Surgical History:   Procedure Laterality Date    WISDOM TOOTH EXTRACTION Bilateral          PERTINENT MEDICATIONS:  Current Outpatient Medications   Medication Sig Dispense Refill    famotidine (PEPCID) 20 MG tablet Take 1 tablet (20 mg) by mouth 2 times daily as needed (heartburn) 60 tablet 0    norethindrone-ethinyl estradiol (NORTREL 1/35, 28,) 1-35 MG-MCG tablet Take 1 tablet by mouth daily 84 tablet 3         SOCIAL HISTORY:  Social History     Socioeconomic History    Marital status: Single     Spouse name: Not on file    Number of children: Not on file    Years of education: Not on file    Highest education level: Not on file   Occupational History    Not on file   Tobacco Use    Smoking status: Never    Smokeless tobacco: Never    Tobacco comments:     no tobacco exposure   Vaping Use    Vaping Use: Never used   Substance and Sexual Activity    Alcohol use: Yes     Comment: rarely    Drug use: No    Sexual activity: Yes     Partners: Male     Birth control/protection: Pill   Other Topics Concern    Parent/sibling w/ CABG, MI or angioplasty before 65F 55M? Not Asked   Social History Narrative    Not on file     Social Determinants of Health     Financial Resource Strain: Not on file   Food Insecurity: Not on file   Transportation Needs: Not on file   Physical Activity: Not on file   Stress: Not on  file   Social Connections: Not on file   Intimate Partner Violence: Not on file   Housing Stability: Not on file       FAMILY HISTORY:  Denies colon/panc/esophageal/other GI CA, no other Garcia or other HPS-related Drew. No IBD/celiac, no other AI/liver/thyroid disease.    Family History   Problem Relation Age of Onset    Hypertension Maternal Grandmother     Diabetes Maternal Grandfather     Hypertension Maternal Grandfather     Breast Cancer Maternal Grandfather     Cancer Other         osteosarcoma    Melanoma No family hx of        PHYSICAL EXAMINATION:  Vitals reviewed  LMP 07/06/2023 (Approximate)     General: Patient appears well in no acute distress.    Skin: No visualized rash or lesions on visualized skin  HEENT:    EOMI, no erythema, sclera icterus or discharge noted.  Mouth mucosa intact, pink, moist  No cervical or supraclavicular lymphadenopathy. Thyroid gland not enlarged.  Resp: breathing comfortably without accessory muscle usage, speaking in full sentences, no cough. Lung sounds clear  Card: Regular and rhythmic S1 and S2. No gallop or rub. Soft 1/6 heart murmur at the right upper sternal borer.  No LE edema.  Abdomen: Active bowel sounds X 4 quadrants. Soft to palpation. Tenderness in the RLQ and LLQ.  No guarding or rebound tenderness. Jim's sign negative.  MSK: Appears to have normal range of motion based on visualized movements  Neurologic: No apparent tremors, facial movements symmetric  Psych: affect normal, alert and oriented      ASSESSMENT/PLAN:    ICD-10-CM    1. RLQ abdominal pain  R10.31 Enteric Bacteria and Virus Panel by KAYLIE Stool     Ova and Parasite Exam Routine     Calprotectin Feces     cholestyramine (QUESTRAN) 4 g packet     dicyclomine (BENTYL) 10 MG capsule     Enteric Bacteria and Virus Panel by KAYLIE Stool     Ova and Parasite Exam Routine     Calprotectin Feces      2. Diarrhea, unspecified type  R19.7 Adult GI  Referral - Consult Only     Enteric Bacteria and Virus  Panel by KAYLIE Stool     Ova and Parasite Exam Routine     Calprotectin Feces     cholestyramine (QUESTRAN) 4 g packet     Enteric Bacteria and Virus Panel by KAYLIE Stool     Ova and Parasite Exam Routine     Calprotectin Feces      3. Pelvic pain in female  R10.2 Adult GI  Referral - Consult Only         28 year old female  presented  to GI clinic for evaluation of lower abdominal pain for about 6 months. Cannot recall any preceding events or name triggers of her pain. Complains of changes in her bowel habits to mushy and loose stools on some days, which alternate with formed stools. She was watching her diet, but couldn't identify any correlation between certain types of food and her symptoms. Patient said that she has nausea once in a while. Back in May, she started having acid reflux. Was seen at the ER on 6/2 for mid-chest pain. Started famotidine and found it effective.   We discussed differential diagnosis including but not limited to IBS, infection, malabsorption, side effect of medication,  pathology, and least likely, IBD. Noted persistent mild elevation in bilirubin, likely benign etiology (Gilbert syndrome?).  Noted normal liver, gallbladder, spleen,pancreas,and biliary tree on CT scan in April. There was a questionable prominent left ovarian cystic area measuring up to 1.8 cm  in maximum dimension. Difficult to separate bowel from the right adnexa. Cannot exclude right adnexal prominence. No free fluid. No free air. No inflammatory changes.  Patient was recently seen and evaluated by gynecology. Possibility of endometriosis discussed but the patient is already on oral contraceptives, which is the first line treatment, and is still having pain in the abdomen. Was sent to GI to exclude other pathologies.    Suggested to complete stool studies and try symptomatic management.  A trial of cholestyramine bid was ordered. Start with 2 gram at supper for one week. Then, increase to 2 gram twice a day  with meals.  Use dicyclomine as needed for abdominal cramping or pain.  May want to try increasing fiber in her diet. Educated on elimination diet, particularly focusing on lactose, spicy foods, artificial sweeteners, coffee,and honey.  Briefly discussed an option of screening colonoscopy if symptoms persist.     Patient verbalized understanding and appreciation of care provided. Stated that all of the questions were answered to her/his satisfaction.  RTC in one month    Thank you for this consultation. It was a pleasure to participate in the care of this patient; please contact us with any further questions.    CINDY Albarran, FNP-C  [facility]  Gastroenterology Department  [place], [place]    This note was created with Dragon voice recognition software, and while reviewed for accuracy, inadvertent minor typographic errors may occur. Please contact the provider if you have any questions.

## 2023-09-11 NOTE — PATIENT INSTRUCTIONS
"It was a pleasure taking care of you today.  I've included a brief summary of our discussion and care plan from today's visit below.  Please review this information with your primary care provider.  ______________________________________________________________________    My recommendations are summarized as follows:    Please collect a stool specimen for studies. Check your MyChart in a few days for results.    2. Start taking cholestyramine 2 gram with supper for one week. Then, increase the dose to twice a day with meals. This is ordered for diarrhea. (It should also help with your cholesterol).    3. Take dicyclomine as needed for abdominal pain, as needed.    4. Watch your diet and try elimination diet to see if any foods cause your symptoms (dairy, honey, coffee, onion, garlic, etc).    Return to GI Clinic in one month to review your progress.    ______________________________________________________________________    Irritable bowel syndrome (IBS)   Irritable bowel syndrome (IBS) is a chronic condition of the digestive system. Its primary symptoms are abdominal pain and changes in bowel habits (eg, constipation and/or diarrhea).  There are a number of theories about how and why irritable bowel syndrome (IBS) develops. Despite intensive research, the cause is not clear.   -One theory suggests that IBS is caused by abnormal contractions of the colon and intestines (hence the term \"spastic bowel,\" which has sometimes been used to describe IBS).   -Some people develop IBS after a severe gastrointestinal infection (eg, Salmonella or Campylobacter, or viruses). However, it is not clear how the infection triggers IBS to develop, and most people with IBS do not have a history of these infections.  -People with IBS who seek medical help are more likely to suffer from anxiety and stress than those who do not seek help. Stress and anxiety are known to affect the intestine; thus, it is likely that anxiety and stress " "worsen symptoms.    -Food intolerances are common in patients with IBS, raising the possibility that it is caused by food sensitivity or allergy. This theory has been difficult to prove, although it continues to be studied.   A number of foods are known to cause symptoms that mimic or aggravate IBS, including dairy products (which contain lactose), legumes (such as beans), and cruciferous vegetables (such as broccoli, cauliflower, [place] sprouts, and cabbage). These foods increase intestinal gas, which can cause cramps.    -Many researchers believe that IBS is caused by heightened sensitivity of the intestines. The medical term for this is \"visceral hyperalgesia.\" This theory proposes that nerves in the bowels are overactive in people with IBS, so that normal amounts of gas or movement are perceived as excessive and painful.     A person with irritable bowel syndrome may have frequent loose stools. Bowel movements usually occur during the daytime, and most often in the morning or after meals. Diarrhea is often preceded by a sense of extreme urgency and followed by a feeling of incomplete emptying. About one-half of people with IBS also notice mucous discharge with diarrhea. Diarrhea occurring during the night is very unusual with IBS.     Treatments are often given to reduce the pain and other symptoms of IBS, and it may be necessary to try more than one combination of treatments to find the one that is most helpful for you.  Diet:  The first step in treating IBS is usually to monitor your symptoms, daily bowel habits, and any other factors that may affect your bowels. This can help to identify factors that worsen symptoms in some people with IBS, such as lactose or other food intolerances and stress. Keeping a daily diary to track your diet and bowel symptoms can be helpful.  Many clinicians recommend temporarily eliminating milk products, since lactose intolerance is common and can aggravate IBS or cause " symptoms similar to IBS. The greatest concentration of lactose is found in milk and ice cream, although it is present in smaller quantities in yogurt, cottage and other cheeses.Try eliminating dairy for 2 weeks. If IBS symptoms improve, it is reasonable to continue avoiding lactose.   Many foods are only partially digested in the small intestines. When they reach the colon (large intestine), further digestion takes place, which may cause gas and cramps. Eliminating these foods temporarily is reasonable if gas or bloating is bothersome.  The most common gas-producing foods are legumes (such as beans) and cruciferous vegetables (such as cabbage, [place] sprouts, cauliflower, and broccoli). In addition, some people have trouble with onions, celery, carrots, raisins, bananas, apricots, prunes, sprouts, and wheat.  A bulk-forming fiber supplement, such as Psyllium, may also be recommended to increase fiber intake since it is difficult to consume enough fiber in the diet. Fiber supplements should be started at a low dose and increased slowly over several weeks to reduce the symptoms of excessive intestinal gas, which can occur in some people when beginning fiber therapy.   Some foods are easy to digest for people with IBS related diarrhea. These include the following: plain pasta or noodles, white rice. Boiled or baked potato. Whole white bread, German bread, plain fish, plain chicken or turkey, soft-boiled eggs, rice or soy milk, cooked carrots,and cereals (plain Cornflakes, Rice Krispies, Corn or Rice Chex, or Cheerios).    Other approaches to management of IBS:  Stress and anxiety can worsen IBS in some people. The best approach for reducing stress and anxiety depends upon your situation and the severity of your symptoms.  Although many drugs are available to treat the symptoms of IBS, these drugs do not cure the condition. They are mainly used to relieve symptoms.        GILBERT SYNDROME   Gilbert syndrome, also  known as constitutional hepatic dysfunction or familial nonhemolytic jaundice, is an inherited disorder of the liver that results in an overabundance of a substance known as bilirubin. Gilbert syndrome is not dangerous and does not cause long-term problems, so it is not necessary to treat Gilbert syndrome.   What is bilirubin?  Bilirubin is normally present in the blood in small amounts. It is a by-product of the breakdown of hemoglobin in old red blood cells, and it is usually converted by the liver into a form that can be excreted from the body in stool. Abnormalities in this process can cause blood levels of bilirubin to rise above normal.  People with Gilbert syndrome have an inherited abnormality that causes reduced production of an enzyme involved in processing bilirubin.  What are symptoms of Gilbert syndrome?  Most patients with Gilbert syndrome have no symptoms. The disorder is frequently diagnosed incidentally when a lab test done for another reason (such as a life insurance examination) shows an abnormally high level of unconjugated bilirubin.  When the level of unconjugated bilirubin rises beyond a certain point, the bilirubin pigment begins to discolor the whites of the eyes (making them appear light yellow). With even higher levels, the skin may also become yellow (jaundice). This potentially alarming appearance makes many people with Gilbert syndrome see their health care provider.  Levels of bilirubin can fluctuate in people with Gilbert syndrome. They may be highest during an infection (such as the flu), following periods of fasting, and during menstrual periods in some women. Gilbert syndrome may also become apparent when an affected person takes certain drugs that require the enzyme involved in bilirubin processing in the liver. A cancer drug called irinotecan is one example.  No specific treatment is required for people with Gilbert syndrome. However, there is an increased risk of side effects  from certain drugs that are broken down by the liver such as acetaminophen. If you have Gilbert syndrome, tell all your doctors and nurses that you have it. Also tell them about all the medicines you take, including over-the-counter and herbal medicines.      ______________________________________________________________________    Who do I call with any questions after my visit?  Please be in touch if there are any further questions that arise following today's visit.  There are multiple ways to contact your gastroenterology care team.      During business hours, you may reach a Gastroenterology nurse at [phone], option 3.     To schedule or reschedule an appointment, please call [phone].   To schedule your imaging studies (CT, MRI, ultrasound)  call [phone] (or toll-free # [phone])  To schedule your lab work at [facility], please call [phone]    You can always send a secure message through Liquid[employer]anner.  Liqu[employer]anner messages are answered by your nurse or doctor typically within 24 hours.  Please allow extra time on weekends and holidays.      For urgent/emergent questions after business hours, you may reach the on-call GI Fellow by contacting the [facility]  at [phone].    In order for your refill to be processed in a timely fashion, it is your responsibility to ensure you follow the recommendations from your provider regarding your laboratory studies and follow up appointments.       How will I get the results of any tests ordered?    You will receive all of your results.  If you have signed up for Liquid[employer]anner, any tests ordered at your visit will be available to you after your physician reviews them.  Typically this takes 1-2 weeks.  If there are urgent results that require a change in your care plan, your physician or nurse will call you to discuss the next steps.   What is LiquidPlanner?  LiquidPlanner is a secure way for you to access all of your healthcare  records from the [facility].  It is a web based computer program, so you can sign on to it from any location.  It also allows you to send secure messages to your care team.  I recommend signing up for [employer] access if you have not already done so and are comfortable with using a computer.    How to I schedule a follow-up visit?  If you did not schedule a follow-up visit today, please call [phone] to schedule a follow-up office visit.      Sincerely,  SELINA Albarran,  [facility],  Division of Gastroenterology   ([facility])

## 2023-09-12 PROCEDURE — 83993 ASSAY FOR CALPROTECTIN FECAL: CPT | Performed by: NURSE PRACTITIONER

## 2023-09-12 PROCEDURE — 87177 OVA AND PARASITES SMEARS: CPT | Performed by: NURSE PRACTITIONER

## 2023-09-12 PROCEDURE — 87209 SMEAR COMPLEX STAIN: CPT | Performed by: NURSE PRACTITIONER

## 2023-09-12 PROCEDURE — 87507 IADNA-DNA/RNA PROBE TQ 12-25: CPT | Performed by: NURSE PRACTITIONER

## 2023-09-13 ENCOUNTER — APPOINTMENT (OUTPATIENT)
Dept: LAB | Facility: CLINIC | Age: 28
End: 2023-09-13
Payer: COMMERCIAL

## 2023-09-13 LAB
ADV 40+41 DNA STL QL NAA+NON-PROBE: NEGATIVE
ASTRO TYP 1-8 RNA STL QL NAA+NON-PROBE: NEGATIVE
C CAYETANENSIS DNA STL QL NAA+NON-PROBE: NEGATIVE
CAMPYLOBACTER DNA SPEC NAA+PROBE: NEGATIVE
CRYPTOSP DNA STL QL NAA+NON-PROBE: NEGATIVE
E COLI O157 DNA STL QL NAA+NON-PROBE: NORMAL
E HISTOLYT DNA STL QL NAA+NON-PROBE: NEGATIVE
EAEC ASTA GENE ISLT QL NAA+PROBE: NEGATIVE
EC STX1+STX2 GENES STL QL NAA+NON-PROBE: NEGATIVE
EPEC EAE GENE STL QL NAA+NON-PROBE: NEGATIVE
ETEC LTA+ST1A+ST1B TOX ST NAA+NON-PROBE: NEGATIVE
G LAMBLIA DNA STL QL NAA+NON-PROBE: NEGATIVE
NOROVIRUS GI+II RNA STL QL NAA+NON-PROBE: NEGATIVE
P SHIGELLOIDES DNA STL QL NAA+NON-PROBE: NEGATIVE
RVA RNA STL QL NAA+NON-PROBE: NEGATIVE
SALMONELLA SP RPOD STL QL NAA+PROBE: NEGATIVE
SAPO I+II+IV+V RNA STL QL NAA+NON-PROBE: NEGATIVE
SHIGELLA SP+EIEC IPAH ST NAA+NON-PROBE: NEGATIVE
V CHOLERAE DNA SPEC QL NAA+PROBE: NEGATIVE
VIBRIO DNA SPEC NAA+PROBE: NEGATIVE
Y ENTEROCOL DNA STL QL NAA+PROBE: NEGATIVE

## 2023-09-14 LAB — O+P STL MICRO: NEGATIVE

## 2023-09-15 LAB — CALPROTECTIN STL-MCNT: 59.3 MG/KG (ref 0–49.9)

## 2023-09-19 ENCOUNTER — MYC MEDICAL ADVICE (OUTPATIENT)
Dept: GASTROENTEROLOGY | Facility: CLINIC | Age: 28
End: 2023-09-19
Payer: COMMERCIAL

## 2023-09-19 DIAGNOSIS — R19.7 DIARRHEA, UNSPECIFIED TYPE: ICD-10-CM

## 2023-09-19 DIAGNOSIS — R10.31 RLQ ABDOMINAL PAIN: ICD-10-CM

## 2023-09-19 NOTE — TELEPHONE ENCOUNTER
Sdaie can you clarify how you would like patient to start and continue with medication.     Carolina Brothers Rn

## 2023-09-20 RX ORDER — CHOLESTYRAMINE 4 G/9G
1 POWDER, FOR SUSPENSION ORAL 2 TIMES DAILY WITH MEALS
Qty: 30 PACKET | Refills: 0 | Status: SHIPPED | OUTPATIENT
Start: 2023-09-20 | End: 2023-10-16

## 2023-10-13 NOTE — PROGRESS NOTES
Gastroenterology CLINIC VISIT, RETURN PATIENT    CC/REFERRING PROVIDER: Christianne Vanegas  REASON FOR CONSULTATION: diarrhea, pelvic pain    HPI: 28 year old female presented to GI clinic for follow up on lower abdominal pain. I saw the patient 4 weeks ago. She complained of changes in bowel habits for a few months, was having 4-5 mushy stools a day. Complained of lower abdominal pain and bloating. Working diagnoses include IBS, infection, malabsorption, side effect of medication,  pathology, and least likely, IBD.  Suggested to complete stool studies and to try symptomatic management first.  A trial of cholestyramine bid was ordered. Started with 2 gram at supper. Patient stated that she was inconsistent with taking the medication as it was causing some nausea at times. Used dicyclomine as needed for abdominal cramping or pain. Has been consuming nuts to Increase dietary fiber.  Stated that she had only 4 days of loose stools since our last encounter. Stated is not certain if there is any correlation with certain types of food, but said that she had diarrhea after consuming pizza and taco. Her abdominal discomfort did not change. She took dicyclomine a few times for moderate to severe pain. Denies any fever, chills, emesis, melena, or hematochezia.    PREVIOUS ENDOSCOPY:  None  PERTINENT STUDIES Reviewed in EMR  4/19/23 CT scan of abdomen  IMPRESSION: Perhaps mildly prominent adnexal structures bilaterally.  Given recent normal pelvic ultrasound, follow-up pelvic ultrasound an  approximately 4 weeks may be helpful. Pelvic MRI could be considered  for better spatial resolution. 2 mm possible left renal cyst.    ROS: 10pt ROS performed and otherwise negative.    PAST MEDICAL HISTORY:  No past medical history on file.    PREVIOUS ABDOMINAL/GYNECOLOGIC SURGERIES:    Past Surgical History:   Procedure Laterality Date    WISDOM TOOTH EXTRACTION Bilateral          PERTINENT MEDICATIONS:  Current Outpatient Medications    Medication Sig Dispense Refill    dicyclomine (BENTYL) 10 MG capsule Take 1 capsule (10 mg) by mouth 2 times daily as needed (as needed, for abdominal cramping and pain) 30 capsule 0    famotidine (PEPCID) 20 MG tablet Take 1 tablet (20 mg) by mouth 2 times daily as needed (heartburn) 60 tablet 0    norethindrone-ethinyl estradiol (NORTREL 1/35, 28,) 1-35 MG-MCG tablet Take 1 tablet by mouth daily 84 tablet 3         SOCIAL HISTORY:  Social History     Socioeconomic History    Marital status: Single     Spouse name: Not on file    Number of children: Not on file    Years of education: Not on file    Highest education level: Not on file   Occupational History    Not on file   Tobacco Use    Smoking status: Never    Smokeless tobacco: Never    Tobacco comments:     no tobacco exposure   Vaping Use    Vaping Use: Never used   Substance and Sexual Activity    Alcohol use: Yes     Comment: rarely    Drug use: No    Sexual activity: Yes     Partners: Male     Birth control/protection: Pill   Other Topics Concern    Parent/sibling w/ CABG, MI or angioplasty before 65F 55M? Not Asked   Social History Narrative    Not on file     Social Determinants of Health     Financial Resource Strain: Not on file   Food Insecurity: Not on file   Transportation Needs: Not on file   Physical Activity: Not on file   Stress: Not on file   Social Connections: Not on file   Interpersonal Safety: Not on file   Housing Stability: Not on file       FAMILY HISTORY:  Denies colon/panc/esophageal/other GI CA, no other Garcia or other HPS-related Drew. No IBD/celiac, no other AI/liver/thyroid disease.    Family History   Problem Relation Age of Onset    Hypertension Maternal Grandmother     Diabetes Maternal Grandfather     Hypertension Maternal Grandfather     Breast Cancer Maternal Grandfather     Cancer Other         osteosarcoma    Melanoma No family hx of        PHYSICAL EXAMINATION:  Vitals reviewed  /74 (BP Location: Right arm, Patient  "Position: Sitting, Cuff Size: Adult Regular)   Pulse 60   Ht 1.753 m (5' 9\")   Wt 63.5 kg (140 lb)   LMP 08/28/2023 (Approximate)   BMI 20.67 kg/m      General: Patient appears well in no acute distress.    Skin: No visualized rash or lesions on visualized skin  HEENT:    EOMI, no erythema, sclera icterus or discharge noted.  Mouth mucosa intact, pink, moist  No cervical or supraclavicular lymphadenopathy. Thyroid gland not enlarged.  Resp: breathing comfortably without accessory muscle usage, speaking in full sentences, no cough. Lung sounds clear  Card: Regular and rhythmic S1 and S2. No gallop or rub. No murmur.  No LE edema.  Abdomen: Active bowel sounds X 4 quadrants. Soft to palpation. Mild tenderness in suprapubic area.  No guarding or rebound tenderness. Jim's sign negative.  MSK: Appears to have normal range of motion based on visualized movements  Neurologic: No apparent tremors, facial movements symmetric  Psych: affect normal, alert and oriented      ASSESSMENT/PLAN:    ICD-10-CM    1. Diarrhea, unspecified type  R19.7 Adult GI Clinic Follow-Up Order (Blank)      2. RLQ abdominal pain  R10.31 Adult GI Clinic Follow-Up Order (Blank)         28 year old female  presented  to GI clinic for a 4 weeks follow up on lower abdominal discomfort and changes in stool pattern for about 5-6 months. She tried cholestyramine for diarrhea and took dicyclomine as needed for abdominal pain. Reported improvement in diarrhea, had only 4 episodes of mushy stool. Dicyclomine helps abdominal discomfort. No new or red flag symptoms.  Her stool studies were negative for parasites and enteric pathogens. Borderline calprotectin.  Suspect likely post-infectious diarrhea with components of functional bowel disease. Suggested to stop cholestyramine. Continue to take Bentyl as needed.  Increase dietary fiber or take a fiber supplement.  If symptoms return or become worse, return to GI clinic. May repeat fecal calprotectin and " proceed with colonoscopy if indicated.  Patient verbalized understanding and appreciation of care provided. Stated that all of the questions were answered to her/his satisfaction.  RTC as needed    Thank you for this consultation. It was a pleasure to participate in the care of this patient; please contact us with any further questions.    CINDY Albarran, AVNIP-C  [facility]  Gastroenterology Department  [place], [place]    This note was created with Dragon voice recognition software, and while reviewed for accuracy, inadvertent minor typographic errors may occur. Please contact the provider if you have any questions.

## 2023-10-16 ENCOUNTER — OFFICE VISIT (OUTPATIENT)
Dept: GASTROENTEROLOGY | Facility: CLINIC | Age: 28
End: 2023-10-16
Attending: NURSE PRACTITIONER
Payer: COMMERCIAL

## 2023-10-16 VITALS
WEIGHT: 140 LBS | DIASTOLIC BLOOD PRESSURE: 74 MMHG | SYSTOLIC BLOOD PRESSURE: 122 MMHG | BODY MASS INDEX: 20.73 KG/M2 | HEART RATE: 60 BPM | HEIGHT: 69 IN

## 2023-10-16 DIAGNOSIS — R19.7 DIARRHEA, UNSPECIFIED TYPE: Primary | ICD-10-CM

## 2023-10-16 DIAGNOSIS — R10.31 RLQ ABDOMINAL PAIN: ICD-10-CM

## 2023-10-16 PROCEDURE — 99214 OFFICE O/P EST MOD 30 MIN: CPT | Performed by: NURSE PRACTITIONER

## 2023-10-16 ASSESSMENT — PAIN SCALES - GENERAL: PAINLEVEL: MODERATE PAIN (4)

## 2023-10-16 NOTE — LETTER
10/16/2023         RE: Rhiannon Landeros  [address] [place] [address]        Dear Colleague,    Thank you for referring your patient, Rhiannon Landeros, to the [facility]. Please see a copy of my visit note below.    Gastroenterology CLINIC VISIT, RETURN PATIENT    CC/REFERRING PROVIDER: Christianne Vanegas  REASON FOR CONSULTATION: diarrhea, pelvic pain    HPI: 28 year old female presented to GI clinic for follow up on lower abdominal pain. I saw the patient 4 weeks ago. She complained of changes in bowel habits for a few months, was having 4-5 mushy stools a day. Complained of lower abdominal pain and bloating. Working diagnoses include IBS, infection, malabsorption, side effect of medication,  pathology, and least likely, IBD.  Suggested to complete stool studies and to try symptomatic management first.  A trial of cholestyramine bid was ordered. Started with 2 gram at supper. Patient stated that she was inconsistent with taking the medication as it was causing some nausea at times. Used dicyclomine as needed for abdominal cramping or pain. Has been consuming nuts to Increase dietary fiber.  Stated that she had only 4 days of loose stools since our last encounter. Stated is not certain if there is any correlation with certain types of food, but said that she had diarrhea after consuming pizza and taco. Her abdominal discomfort did not change. She took dicyclomine a few times for moderate to severe pain. Denies any fever, chills, emesis, melena, or hematochezia.    PREVIOUS ENDOSCOPY:  None  PERTINENT STUDIES Reviewed in EMR  4/19/23 CT scan of abdomen  IMPRESSION: Perhaps mildly prominent adnexal structures bilaterally.  Given recent normal pelvic ultrasound, follow-up pelvic ultrasound an  approximately 4 weeks may be helpful. Pelvic MRI could be considered  for better spatial resolution. 2 mm possible left renal cyst.    ROS: 10pt ROS performed and otherwise  negative.    PAST MEDICAL HISTORY:  No past medical history on file.    PREVIOUS ABDOMINAL/GYNECOLOGIC SURGERIES:    Past Surgical History:   Procedure Laterality Date     WISDOM TOOTH EXTRACTION Bilateral          PERTINENT MEDICATIONS:  Current Outpatient Medications   Medication Sig Dispense Refill     dicyclomine (BENTYL) 10 MG capsule Take 1 capsule (10 mg) by mouth 2 times daily as needed (as needed, for abdominal cramping and pain) 30 capsule 0     famotidine (PEPCID) 20 MG tablet Take 1 tablet (20 mg) by mouth 2 times daily as needed (heartburn) 60 tablet 0     norethindrone-ethinyl estradiol (NORTREL 1/35, 28,) 1-35 MG-MCG tablet Take 1 tablet by mouth daily 84 tablet 3         SOCIAL HISTORY:  Social History     Socioeconomic History     Marital status: Single     Spouse name: Not on file     Number of children: Not on file     Years of education: Not on file     Highest education level: Not on file   Occupational History     Not on file   Tobacco Use     Smoking status: Never     Smokeless tobacco: Never     Tobacco comments:     no tobacco exposure   Vaping Use     Vaping Use: Never used   Substance and Sexual Activity     Alcohol use: Yes     Comment: rarely     Drug use: No     Sexual activity: Yes     Partners: Male     Birth control/protection: Pill   Other Topics Concern     Parent/sibling w/ CABG, MI or angioplasty before 65F 55M? Not Asked   Social History Narrative     Not on file     Social Determinants of Health     Financial Resource Strain: Not on file   Food Insecurity: Not on file   Transportation Needs: Not on file   Physical Activity: Not on file   Stress: Not on file   Social Connections: Not on file   Interpersonal Safety: Not on file   Housing Stability: Not on file       FAMILY HISTORY:  Denies colon/panc/esophageal/other GI CA, no other Garcia or other HPS-related Drew. No IBD/celiac, no other AI/liver/thyroid disease.    Family History   Problem Relation Age of Onset     Hypertension  "Maternal Grandmother      Diabetes Maternal Grandfather      Hypertension Maternal Grandfather      Breast Cancer Maternal Grandfather      Cancer Other         osteosarcoma     Melanoma No family hx of        PHYSICAL EXAMINATION:  Vitals reviewed  /74 (BP Location: Right arm, Patient Position: Sitting, Cuff Size: Adult Regular)   Pulse 60   Ht 1.753 m (5' 9\")   Wt 63.5 kg (140 lb)   LMP 08/28/2023 (Approximate)   BMI 20.67 kg/m      General: Patient appears well in no acute distress.    Skin: No visualized rash or lesions on visualized skin  HEENT:    EOMI, no erythema, sclera icterus or discharge noted.  Mouth mucosa intact, pink, moist  No cervical or supraclavicular lymphadenopathy. Thyroid gland not enlarged.  Resp: breathing comfortably without accessory muscle usage, speaking in full sentences, no cough. Lung sounds clear  Card: Regular and rhythmic S1 and S2. No gallop or rub. No murmur.  No LE edema.  Abdomen: Active bowel sounds X 4 quadrants. Soft to palpation. Mild tenderness in suprapubic area.  No guarding or rebound tenderness. Jim's sign negative.  MSK: Appears to have normal range of motion based on visualized movements  Neurologic: No apparent tremors, facial movements symmetric  Psych: affect normal, alert and oriented      ASSESSMENT/PLAN:    ICD-10-CM    1. Diarrhea, unspecified type  R19.7 Adult GI Clinic Follow-Up Order (Blank)      2. RLQ abdominal pain  R10.31 Adult GI Clinic Follow-Up Order (Blank)         28 year old female  presented  to GI clinic for a 4 weeks follow up on lower abdominal discomfort and changes in stool pattern for about 5-6 months. She tried cholestyramine for diarrhea and took dicyclomine as needed for abdominal pain. Reported improvement in diarrhea, had only 4 episodes of mushy stool. Dicyclomine helps abdominal discomfort. No new or red flag symptoms.  Her stool studies were negative for parasites and enteric pathogens. Borderline " calprotectin.  Suspect likely post-infectious diarrhea with components of functional bowel disease. Suggested to stop cholestyramine. Continue to take Bentyl as needed.  Increase dietary fiber or take a fiber supplement.  If symptoms return or become worse, return to GI clinic. May repeat fecal calprotectin and proceed with colonoscopy if indicated.  Patient verbalized understanding and appreciation of care provided. Stated that all of the questions were answered to her/his satisfaction.  RTC as needed    Thank you for this consultation. It was a pleasure to participate in the care of this patient; please contact us with any further questions.    CINDY Albarran, FNP-C  [facility]  Gastroenterology Department  [place], [place]    This note was created with Dragon voice recognition software, and while reviewed for accuracy, inadvertent minor typographic errors may occur. Please contact the provider if you have any questions.       Again, thank you for allowing me to participate in the care of your patient.        Sincerely,        CINDY ALBARRAN CNP

## 2023-10-16 NOTE — PATIENT INSTRUCTIONS
It was a pleasure taking care of you today.  I've included a brief summary of our discussion and care plan from today's visit below.  Please review this information with your primary care provider.  ______________________________________________________________________    My recommendations are summarized as follows:    Stop cholestyramine.    2. Take dicyclomine as needed for moderate to severe abdominal cramping and bloating.    3. Increase dietary fiber or start taking a fiber supplement.    4. Do not hesitate to reach our GI office if your symptoms return or become worse. Next step will be to repeat stool calprotectin and schedule colonoscopy.    Return to GI Clinic as needed   ______________________________________________________________________  BLOATING AND GAS  Some people feel that they pass too much gas or burp too frequently, both of which can be a source of embarrassment and discomfort. The average adult produces about one to three pints of gas each day, which is passed through the anus 14 to 23 times per day. Burping occasionally before or after meals is also normal.  The amount of gas produced by the body depends upon your diet and other individual factors. However, most people who complain of excessive gas do not produce more gas than the average person. Instead, they are more aware of normal amounts of gas. On the other hand, certain foods and medical conditions can cause you to make excessive amounts of gas.    There are two primary sources of intestinal gas: gas that is ingested (mostly swallowed air) and gas that is produced by bacteria in the colon.   Air swallowing is the major source of gas in the stomach. It is normal to swallow a small amount of air when eating and drinking and when swallowing saliva. You may swallow larger amounts of air when eating food rapidly, gulping liquids, chewing gum, or smoking.     Bacterial production -- The colon normally provides a home for billions of harmless  bacteria, some of which support the health of the bowel. Certain carbohydrates are incompletely digested by enzymes in the stomach and intestines, allowing bacteria to digest them. For example, cabbage, [place] sprouts, and broccoli contain raffinose, a carbohydrate that is poorly digested. These foods tend to cause more gas and flatulence because the raffinose is digested by bacteria once it reaches the colon. The by-products of this process include odorless gases, such as carbon dioxide, hydrogen, and methane. Minor components of gas have an unpleasant odor, including trace amounts of sulfur.  Some people are not able to digest certain carbohydrates. A classic example is lactose, the major sugar contained in dairy products . Thus, consuming large amounts of lactose may lead to increased gas production, along with cramping and diarrhea.  Starch and soluble fiber can also contribute increase gas. Potatoes, corn, noodles, and wheat produce gas, while rice does not. Soluble fiber (found in oat bran, peas and other legumes, beans, and most fruit) also causes gas. Some laxatives contain soluble fiber and may cause gas, particularly during the first few weeks of use.   Certain diseases can also cause excessive bloating and gas. For example, people with diabetes or scleroderma may, over time, have slowing in the activity of the small intestine. This can lead to bacterial overgrowth within the bowel, with poor digestion of carbohydrates and other nutrients. However, even in the absence of apparent disease, some people tend to harbor large numbers of bacteria in their small bowel and are prone to develop excessive gas.   Most people with gas and bloating do not need to have any testing. However, symptoms such as diarrhea, weight loss, abdominal pain, anemia, blood in the stool, lack of appetite, fever, or vomiting can be warning signs of a more serious problem; people with one or more of these symptoms usually require  testing.   Medical conditions  Medical conditions that may increase intestinal gas, bloating or gas pain include the following:  Chronic intestinal disease. Excess gas is often a symptom of chronic intestinal conditions, such as diverticulitis, ulcerative colitis or Crohn's disease.  Small bowel bacterial overgrowth. An increase or change in the bacteria in the small intestine can cause excess gas, diarrhea and weight loss.  Food intolerances. Gas or bloating may occur if your digestive system can't break down and absorb certain foods, such as the sugar in dairy products (lactose) or proteins such as gluten in wheat and other grains.  Constipation. Constipation may make it difficult to pass gas.    Several measures can help to reduce bothersome gas.   Chronic, repeated belching can occur if you swallow large amounts of air (ie, aerophagia). Aerophagia is typically an unconscious process, and is often associated with emotional stress. Treatment focuses on decreasing air swallowing by reducing anxiety, when it is considered to be a cause, as well as on eating slowly without gulping and avoiding carbonated beverages. Avoid chewing gum, sucking on hard candies and drinking through a straw. These activities can cause you to swallow more air.  Diet recommendations --     High-fiber foods. High-fiber foods that can cause gas include beans, onions, broccoli, [place] sprouts, cabbage, cauliflower, artichokes, asparagus, pears, apples, peaches, prunes, whole wheat and bran. You can experiment with which foods affect you most. You may avoid high-fiber foods for a couple of weeks and gradually add them back. Talk to your doctor to ensure you maintain a healthy intake of dietary fiber.  Dairy. Reducing dairy products from your diet can lessen symptoms. You also may try dairy products that are lactose-free or take milk products supplemented with lactase to help with digestion.  Sugar substitutes. Eliminate or reduce sugar  "substitutes, or try a different substitute.  Fried or fatty foods. Dietary fat delays the clearance of gas from the intestines. Cutting back on fried or fatty foods may reduce symptoms.  Carbonated beverages. Avoid or reduce your intake of carbonated beverages.  Fiber supplements. If you use a fiber supplement, talk to your doctor about the amount and type of supplement that is best for you.  Water. To help prevent constipation, drink water with your meals, throughout the day and with fiber supplements.  Certain foods contain specific carbohydrates called \"FODMAPs\" (fermentable oligo-, di-, and monosaccharides and polyols). FODMAPs are poorly absorbed and can result in bloating and gas production in some people.    Over-the-counter medications -- Try an over-the-counter product that contains Simethicone, such as certain antacids (eg, Maalox Anti-Gas, Mylanta Gas, Gas-X, Phazyme). Also, you can try an over-the-counter product that contains activated charcoal (eg, CharcoCaps, CharcoAid) or Beano, which is an over-the-counter preparation that helps to breakdown certain complex carbohydrates. This treatment may be effective in reducing gas after eating beans or other vegetables that contain raffinose. Another option is  Pepto-Bismol to reduce the odor of unpleasant-smelling gas.If the odor from passing gas concerns you, limiting foods high in sulfur-containing compounds -- such as broccoli, [place] sprouts, cabbage, cauliflower, beer and foods high in protein -- may reduce distinctive odors.  Lactase supplements (Lactaid, Digest Dairy Plus, others) help you digest the sugar in dairy products (lactose). These reduce gas symptoms if you're lactose intolerant.                     ______________________________________________________________________    Who do I call with any questions after my visit?  Please be in touch if there are any further questions that arise following today's visit.  There are multiple ways to " contact your gastroenterology care team.      During business hours, you may reach a Gastroenterology nurse at [phone], option 3.     To schedule or reschedule an appointment, please call [phone].   To schedule your imaging studies (CT, MRI, ultrasound)  call [phone] (or toll-free # [phone])  To schedule your lab work at [facility], please call [phone]    You can always send a secure message through [employer].  [employer] messages are answered by your nurse or doctor typically within 24 hours.  Please allow extra time on weekends and holidays.      For urgent/emergent questions after business hours, you may reach the on-call GI Fellow by contacting the [facility]  at [phone].    In order for your refill to be processed in a timely fashion, it is your responsibility to ensure you follow the recommendations from your provider regarding your laboratory studies and follow up appointments.       How will I get the results of any tests ordered?    You will receive all of your results.  If you have signed up for [employer], any tests ordered at your visit will be available to you after your physician reviews them.  Typically this takes 1-2 weeks.  If there are urgent results that require a change in your care plan, your physician or nurse will call you to discuss the next steps.   What is [employer]?  [employer] is a secure way for you to access all of your healthcare records from the [facility].  It is a web based computer program, so you can sign on to it from any location.  It also allows you to send secure messages to your care team.  I recommend signing up for [employer] access if you have not already done so and are comfortable with using a computer.    How to I schedule a follow-up visit?  If you did not schedule a follow-up visit today, please call [phone] to schedule a follow-up office visit.      Sincerely,  SELINA Albarran,  M  [facility],  Division of Gastroenterology   ([facility])

## 2023-12-01 ENCOUNTER — OFFICE VISIT (OUTPATIENT)
Dept: GASTROENTEROLOGY | Facility: CLINIC | Age: 28
End: 2023-12-01
Payer: COMMERCIAL

## 2023-12-01 VITALS
HEART RATE: 64 BPM | HEIGHT: 69 IN | WEIGHT: 149 LBS | DIASTOLIC BLOOD PRESSURE: 76 MMHG | BODY MASS INDEX: 22.07 KG/M2 | SYSTOLIC BLOOD PRESSURE: 120 MMHG

## 2023-12-01 DIAGNOSIS — R10.11 RUQ ABDOMINAL PAIN: ICD-10-CM

## 2023-12-01 DIAGNOSIS — R10.31 RLQ ABDOMINAL PAIN: Primary | ICD-10-CM

## 2023-12-01 PROCEDURE — 99214 OFFICE O/P EST MOD 30 MIN: CPT | Performed by: NURSE PRACTITIONER

## 2023-12-01 ASSESSMENT — PAIN SCALES - GENERAL: PAINLEVEL: SEVERE PAIN (7)

## 2023-12-01 NOTE — LETTER
12/1/2023         RE: Rhiannon Landeros  [address] [place] [address]        Dear Colleague,    Thank you for referring your patient, Rhiannon Landeros, to the [facility]. Please see a copy of my visit note below.    Gastroenterology CLINIC VISIT,  RETURN PATIENT    CC/REFERRING PROVIDER: Christianne Vanegas   REASON FOR CONSULTATION: follow up    HPI: 28 year old female presented to GI clinic for concern of right upper quadrant pain, that occurred 3 days ago.  Patient stated that she ate some greasy foods about 2 hours prior to that.  Pain was intense sharp in character, lasting approximately 30 minutes.  Patient complains of associated nausea, dizziness, and diaphoresis.  She had a repeated episode of the pain, but not as intense.  Stated that  now, the pain is still there but it is dull.  Patient recalls having 3 other similar episodes of pain since March 2023.  Patient denies any musculoskeletal injury.  Pain is not exacerbated by deep breathing or movement of the torso.  She denies any dysuria or changes in urine color.    Previously, I saw the patient for complains of changes in bowel habits X few months, Reported having 4-5 mushy stools a day. Complained of lower abdominal pain and bloating. Working diagnoses include IBS, infection, malabsorption, side effect of medication,  pathology, and least likely, IBD. Suspected possible post-infectious diarrhea with components of functional bowel disease.  Patient takes Bentyl as needed.    PREVIOUS ENDOSCOPY:  None  PERTINENT STUDIES Reviewed in EMR  4/19/23 CT scan of abdomen  IMPRESSION: Perhaps mildly prominent adnexal structures bilaterally.  Given recent normal pelvic ultrasound, follow-up pelvic ultrasound an  approximately 4 weeks may be helpful. Pelvic MRI could be considered  for better spatial resolution. 2 mm possible left renal cyst.    ROS: 10pt ROS performed and otherwise negative.    PAST MEDICAL HISTORY:  No past  medical history on file.    PREVIOUS ABDOMINAL/GYNECOLOGIC SURGERIES:    Past Surgical History:   Procedure Laterality Date     WISDOM TOOTH EXTRACTION Bilateral          PERTINENT MEDICATIONS:  Current Outpatient Medications   Medication Sig Dispense Refill     dicyclomine (BENTYL) 10 MG capsule Take 1 capsule (10 mg) by mouth 2 times daily as needed (as needed, for abdominal cramping and pain) 30 capsule 0     famotidine (PEPCID) 20 MG tablet Take 1 tablet (20 mg) by mouth 2 times daily as needed (heartburn) 60 tablet 0     norethindrone-ethinyl estradiol (NORTREL 1/35, 28,) 1-35 MG-MCG tablet Take 1 tablet by mouth daily 84 tablet 3         SOCIAL HISTORY:  Social History     Socioeconomic History     Marital status: Single     Spouse name: Not on file     Number of children: Not on file     Years of education: Not on file     Highest education level: Not on file   Occupational History     Not on file   Tobacco Use     Smoking status: Never     Smokeless tobacco: Never     Tobacco comments:     no tobacco exposure   Vaping Use     Vaping Use: Never used   Substance and Sexual Activity     Alcohol use: Yes     Comment: rarely     Drug use: No     Sexual activity: Yes     Partners: Male     Birth control/protection: Pill   Other Topics Concern     Parent/sibling w/ CABG, MI or angioplasty before 65F 55M? Not Asked   Social History Narrative     Not on file     Social Determinants of Health     Financial Resource Strain: Not on file   Food Insecurity: Not on file   Transportation Needs: Not on file   Physical Activity: Not on file   Stress: Not on file   Social Connections: Not on file   Interpersonal Safety: Not on file   Housing Stability: Not on file       FAMILY HISTORY:  Denies colon/panc/esophageal/other GI CA, no other Garcia or other HPS-related Drew. No IBD/celiac, no other AI/liver/thyroid disease.    Family History   Problem Relation Age of Onset     Hypertension Maternal Grandmother      Diabetes Maternal  "Grandfather      Hypertension Maternal Grandfather      Breast Cancer Maternal Grandfather      Cancer Other         osteosarcoma     Melanoma No family hx of        PHYSICAL EXAMINATION:  Vitals reviewed  /76 (BP Location: Right arm, Patient Position: Sitting, Cuff Size: Adult Regular)   Pulse 64   Ht 1.753 m (5' 9\")   Wt 67.6 kg (149 lb)   LMP 11/21/2023 (Approximate)   Breastfeeding No   BMI 22.00 kg/m      General: Patient appears well in no acute distress.    Skin: No visualized rash or lesions on visualized skin  HEENT:    EOMI, no erythema, sclera icterus or discharge noted.  Mouth mucosa intact, pink, moist  No cervical or supraclavicular lymphadenopathy. Thyroid gland not enlarged.  Resp: breathing comfortably without accessory muscle usage, speaking in full sentences, no cough. Lung sounds clear  Card: Regular and rhythmic S1 and S2. No gallop or rub. No murmur.  No LE edema.  Abdomen: Active bowel sounds X 4 quadrants. Soft to palpation.  Localized tenderness in the right upper quadrant.  Diffuse tenderness in lower abdomen.  Negative Jim sign.  Questionable positive CVA?  No guarding or rebound tenderness.   MSK: Appears to have normal range of motion based on visualized movements  Neurologic: No apparent tremors, facial movements symmetric  Psych: affect normal, alert and oriented      ASSESSMENT/PLAN:    ICD-10-CM    1. RLQ abdominal pain  R10.31 US Abdomen Limited      2. RUQ abdominal pain  R10.11 Adult GI Clinic Follow-Up Order (Blank)         28 year old female  presented  to GI clinic for concerns of an episodes of the right upper quadrant pain with associated nausea and diaphoresis.  Patient recalls having 3 more similar episodes since March 2023, but this time the pain was more intense.  Pain lasted approximately 30 minutes and occurred 2 hours after the last meal.  Patient denies alcohol intake.  She had no emesis.  Reported having \"hard painful stool\" prior to pain onset.  Her " stool studies came back negative for enteric panel, and parasites.  Calprotectin was mildly elevated.  She had normal CBC and CMP back in April 2023.  I suggested to undergo further evaluation with abdominal ultrasound to assess for possible cholelithiasis or cholecystitis.  Patient was educated to abstain greasy foods, caffeine and alcohol.  Educated on red flag symptoms and when to seek immediate medical attention.  If ultrasound comes back unremarkable and pain persist, we might proceed with upper GI endoscopy.  She denies any family history of cholecystitis or cholecystectomy.  I previously saw the patient for concerns of abdominal pain and bloating, consistent with a functional bowel disease.  Patient encouraged to increase fiber intake in her diet or to take fiber supplement.  Continue using dicyclomine as needed for abdominal cramping and pain.      Patient verbalized understanding and appreciation of care provided. Stated that all of the questions were answered to her/his satisfaction.  RTC in 1 month    Thank you for this consultation. It was a pleasure to participate in the care of this patient; please contact us with any further questions.    CINDY Albarran, FNP-C  [facility]  Gastroenterology Department  [place], [place]    This note was created with Dragon voice recognition software, and while reviewed for accuracy, inadvertent minor typographic errors may occur. Please contact the provider if you have any questions.       Again, thank you for allowing me to participate in the care of your patient.        Sincerely,        CINDY ALBARRAN CNP

## 2023-12-01 NOTE — PROGRESS NOTES
Gastroenterology CLINIC VISIT,  RETURN PATIENT    CC/REFERRING PROVIDER: Christianne Vanegas   REASON FOR CONSULTATION: follow up    HPI: 28 year old female presented to GI clinic for concern of right upper quadrant pain, that occurred 3 days ago.  Patient stated that she ate some greasy foods about 2 hours prior to that.  Pain was intense sharp in character, lasting approximately 30 minutes.  Patient complains of associated nausea, dizziness, and diaphoresis.  She had a repeated episode of the pain, but not as intense.  Stated that  now, the pain is still there but it is dull.  Patient recalls having 3 other similar episodes of pain since March 2023.  Patient denies any musculoskeletal injury.  Pain is not exacerbated by deep breathing or movement of the torso.  She denies any dysuria or changes in urine color.    Previously, I saw the patient for complains of changes in bowel habits X few months, Reported having 4-5 mushy stools a day. Complained of lower abdominal pain and bloating. Working diagnoses include IBS, infection, malabsorption, side effect of medication,  pathology, and least likely, IBD. Suspected possible post-infectious diarrhea with components of functional bowel disease.  Patient takes Bentyl as needed.    PREVIOUS ENDOSCOPY:  None  PERTINENT STUDIES Reviewed in EMR  4/19/23 CT scan of abdomen  IMPRESSION: Perhaps mildly prominent adnexal structures bilaterally.  Given recent normal pelvic ultrasound, follow-up pelvic ultrasound an  approximately 4 weeks may be helpful. Pelvic MRI could be considered  for better spatial resolution. 2 mm possible left renal cyst.    ROS: 10pt ROS performed and otherwise negative.    PAST MEDICAL HISTORY:  No past medical history on file.    PREVIOUS ABDOMINAL/GYNECOLOGIC SURGERIES:    Past Surgical History:   Procedure Laterality Date    WISDOM TOOTH EXTRACTION Bilateral          PERTINENT MEDICATIONS:  Current Outpatient Medications   Medication Sig Dispense  Refill    dicyclomine (BENTYL) 10 MG capsule Take 1 capsule (10 mg) by mouth 2 times daily as needed (as needed, for abdominal cramping and pain) 30 capsule 0    famotidine (PEPCID) 20 MG tablet Take 1 tablet (20 mg) by mouth 2 times daily as needed (heartburn) 60 tablet 0    norethindrone-ethinyl estradiol (NORTREL 1/35, 28,) 1-35 MG-MCG tablet Take 1 tablet by mouth daily 84 tablet 3         SOCIAL HISTORY:  Social History     Socioeconomic History    Marital status: Single     Spouse name: Not on file    Number of children: Not on file    Years of education: Not on file    Highest education level: Not on file   Occupational History    Not on file   Tobacco Use    Smoking status: Never    Smokeless tobacco: Never    Tobacco comments:     no tobacco exposure   Vaping Use    Vaping Use: Never used   Substance and Sexual Activity    Alcohol use: Yes     Comment: rarely    Drug use: No    Sexual activity: Yes     Partners: Male     Birth control/protection: Pill   Other Topics Concern    Parent/sibling w/ CABG, MI or angioplasty before 65F 55M? Not Asked   Social History Narrative    Not on file     Social Determinants of Health     Financial Resource Strain: Not on file   Food Insecurity: Not on file   Transportation Needs: Not on file   Physical Activity: Not on file   Stress: Not on file   Social Connections: Not on file   Interpersonal Safety: Not on file   Housing Stability: Not on file       FAMILY HISTORY:  Denies colon/panc/esophageal/other GI CA, no other Garcia or other HPS-related Drew. No IBD/celiac, no other AI/liver/thyroid disease.    Family History   Problem Relation Age of Onset    Hypertension Maternal Grandmother     Diabetes Maternal Grandfather     Hypertension Maternal Grandfather     Breast Cancer Maternal Grandfather     Cancer Other         osteosarcoma    Melanoma No family hx of        PHYSICAL EXAMINATION:  Vitals reviewed  /76 (BP Location: Right arm, Patient Position: Sitting, Cuff  "Size: Adult Regular)   Pulse 64   Ht 1.753 m (5' 9\")   Wt 67.6 kg (149 lb)   LMP 11/21/2023 (Approximate)   Breastfeeding No   BMI 22.00 kg/m      General: Patient appears well in no acute distress.    Skin: No visualized rash or lesions on visualized skin  HEENT:    EOMI, no erythema, sclera icterus or discharge noted.  Mouth mucosa intact, pink, moist  No cervical or supraclavicular lymphadenopathy. Thyroid gland not enlarged.  Resp: breathing comfortably without accessory muscle usage, speaking in full sentences, no cough. Lung sounds clear  Card: Regular and rhythmic S1 and S2. No gallop or rub. No murmur.  No LE edema.  Abdomen: Active bowel sounds X 4 quadrants. Soft to palpation.  Localized tenderness in the right upper quadrant.  Diffuse tenderness in lower abdomen.  Negative Jim sign.  Questionable positive CVA?  No guarding or rebound tenderness.   MSK: Appears to have normal range of motion based on visualized movements  Neurologic: No apparent tremors, facial movements symmetric  Psych: affect normal, alert and oriented      ASSESSMENT/PLAN:    ICD-10-CM    1. RLQ abdominal pain  R10.31 US Abdomen Limited      2. RUQ abdominal pain  R10.11 Adult GI Clinic Follow-Up Order (Blank)         28 year old female  presented  to GI clinic for concerns of an episodes of the right upper quadrant pain with associated nausea and diaphoresis.  Patient recalls having 3 more similar episodes since March 2023, but this time the pain was more intense.  Pain lasted approximately 30 minutes and occurred 2 hours after the last meal.  Patient denies alcohol intake.  She had no emesis.  Reported having \"hard painful stool\" prior to pain onset.  Her stool studies came back negative for enteric panel, and parasites.  Calprotectin was mildly elevated.  She had normal CBC and CMP back in April 2023.  I suggested to undergo further evaluation with abdominal ultrasound to assess for possible cholelithiasis or cholecystitis. "  Patient was educated to abstain greasy foods, caffeine and alcohol.  Educated on red flag symptoms and when to seek immediate medical attention.  If ultrasound comes back unremarkable and pain persist, we might proceed with upper GI endoscopy.  She denies any family history of cholecystitis or cholecystectomy.  I previously saw the patient for concerns of abdominal pain and bloating, consistent with a functional bowel disease.  Patient encouraged to increase fiber intake in her diet or to take fiber supplement.  Continue using dicyclomine as needed for abdominal cramping and pain.      Patient verbalized understanding and appreciation of care provided. Stated that all of the questions were answered to her/his satisfaction.  RTC in 1 month    Thank you for this consultation. It was a pleasure to participate in the care of this patient; please contact us with any further questions.    CINDY Albarran, AVNIP-C  [facility]  Gastroenterology Department  [place], [place]    This note was created with Dragon voice recognition software, and while reviewed for accuracy, inadvertent minor typographic errors may occur. Please contact the provider if you have any questions.

## 2023-12-01 NOTE — PATIENT INSTRUCTIONS
"It was a pleasure taking care of you today.  I've included a brief summary of our discussion and care plan from today's visit below.  Please review this information with your primary care provider.  ______________________________________________________________________    My recommendations are summarized as follows:    Differential diagnosis for upper abdominal pain include gallbladder stones, inflammation of gallbladder, gastritis, duodenitis, or ulcers of the stomach or duodenum.  Definitely, irritable bowel can produce this type of pain as well.  I would suggest evaluation of this abdominal ultrasound.  Please call imaging department to schedule the test.    2.  Continue taking dicyclomine as needed for abdominal discomfort.    3.  If ultrasound comes back normal, but your pain persists, we will proceed to upper GI endoscopy    4.  Try to avoid greasy foods, caffeine, and alcohol.  Sometimes, those foods/beverages can provoke pain in people with gallbladder stones or inflammation of the gallbladder.    Return to GI Clinic in 4 weeks to review your progress.    ______________________________________________________________________    What is gastritis?  \"Gastritis\" means inflammation of the stomach lining.  Some people have gastritis that comes on suddenly and lasts only for a short time. Doctors call this \"acute\" gastritis. Other people have gastritis that lasts for months or years. Doctors call this \"chronic\" gastritis.  What causes gastritis?  Different things can cause gastritis, including:  ?An infection in the stomach from bacteria called \"H. pylori\"  ?Medicines called \"nonsteroidal antiinflammatory drugs\" (NSAIDs) - These include aspirin, ibuprofen,(brand names: Advil, Motrin), and naproxen (brand names: Aleve, Naprosyn).  ?Drinking alcohol  ?Conditions in which the body's infection-fighting system attacks the stomach lining  ?Having a serious or life-threatening illness  What are the symptoms of " "gastritis?  People with gastritis have no symptoms. When people do have symptoms, they are due to other conditions that can happen with gastritis, like ulcers. Symptoms from ulcers include:  ?Pain in the upper belly  ?Feeling bloated, or feeling full after eating a small amount of food  ?Decreased appetite  ?Nausea or vomiting  ?Vomiting blood, or having black-colored bowel movements  ?Feeling more tired than usual - This happens if people with gastritis get a condition called \"anemia.\"  Should I call my doctor or nurse?  Call your doctor or nurse if:  ?You have belly pain that gets worse or doesn't go away  ?You vomit blood or have black bowel movements  ?You are losing weight (without trying to)  Will I need tests?  Probably. Your doctor or nurse will ask about your symptoms and do an exam. They might also do:  ?An upper endoscopy - During this procedure, the doctor puts a thin tube with a camera on the end into your mouth and down into your stomach. They will look at the inside of your stomach. During the procedure, they might also do a test called a biopsy. For a biopsy, the doctor takes a small sample of the stomach lining. Then another doctor looks at the sample under a microscope.  ?Tests to check for H. pylori infection. These can include:  Blood tests  Breath tests - These tests measure substances in your breath after you drink a special liquid.  Tests on a small sample of your bowel movement  ?Blood tests to check for anemia  How is gastritis treated?  Treatment depends on what's causing your gastritis.  For example, if NSAIDs are causing your gastritis, your doctor will recommend that you not take those medicines. If alcohol is causing your gastritis, they will recommend that you stop drinking alcohol.  Doctors can use medicines to treat gastritis caused by an H. pylori infection. Most people take 3 or more medicines for 2 weeks. The treatment includes antibiotics plus medicine that helps the stomach make " less acid.  Doctors can use medicines that reduce or block stomach acid to treat other causes of gastritis. The main types of medicines that reduce or block stomach acid are:  ?Antacids  ?Surface agents  ?Histamine blockers  ?Proton pump inhibitors  If your doctor recommends acid-reducing treatment, they will tell you which medicine to use.  What happens after treatment?  Sometimes, people who are treated for an H. pylori infection need follow-up tests to make sure the infection is gone. Follow-up tests include breath tests, lab tests on a sample of bowel movement, or endoscopy.    ______________________________________________________________________    Who do I call with any questions after my visit?  Please be in touch if there are any further questions that arise following today's visit.  There are multiple ways to contact your gastroenterology care team.      During business hours, you may reach a Gastroenterology nurse at [phone], option 3.     To schedule or reschedule an appointment, please call [phone].   To schedule your imaging studies (CT, MRI, ultrasound)  call [phone] (or toll-free # [phone])  To schedule your lab work at [facility], please call [phone]    You can always send a secure message through [employer].  UserVoice messages are answered by your nurse or doctor typically within 24 hours.  Please allow extra time on weekends and holidays.      For urgent/emergent questions after business hours, you may reach the on-call GI Fellow by contacting the [facility]  at [phone].    In order for your refill to be processed in a timely fashion, it is your responsibility to ensure you follow the recommendations from your provider regarding your laboratory studies and follow up appointments.       How will I get the results of any tests ordered?    You will receive all of your results.  If you have signed up for [employer], any tests  ordered at your visit will be available to you after your physician reviews them.  Typically this takes 1-2 weeks.  If there are urgent results that require a change in your care plan, your physician or nurse will call you to discuss the next steps.   What is [employer]?  [employer] is a secure way for you to access all of your healthcare records from the [facility].  It is a web based computer program, so you can sign on to it from any location.  It also allows you to send secure messages to your care team.  I recommend signing up for [employer] access if you have not already done so and are comfortable with using a computer.    How to I schedule a follow-up visit?  If you did not schedule a follow-up visit today, please call [phone] to schedule a follow-up office visit.      Sincerely,  SELINA Albarran,  [facility],  Division of Gastroenterology   ([facility])

## 2023-12-04 ENCOUNTER — ANCILLARY PROCEDURE (OUTPATIENT)
Dept: ULTRASOUND IMAGING | Facility: CLINIC | Age: 28
End: 2023-12-04
Attending: NURSE PRACTITIONER
Payer: COMMERCIAL

## 2023-12-04 DIAGNOSIS — R10.31 RLQ ABDOMINAL PAIN: ICD-10-CM

## 2023-12-04 PROCEDURE — 76705 ECHO EXAM OF ABDOMEN: CPT | Performed by: RADIOLOGY

## 2023-12-06 DIAGNOSIS — R10.31 RLQ ABDOMINAL PAIN: Primary | ICD-10-CM

## 2023-12-18 ENCOUNTER — TELEPHONE (OUTPATIENT)
Dept: GASTROENTEROLOGY | Facility: CLINIC | Age: 28
End: 2023-12-18
Payer: COMMERCIAL

## 2023-12-18 ENCOUNTER — TELEPHONE (OUTPATIENT)
Dept: GASTROENTEROLOGY | Facility: CLINIC | Age: 28
End: 2023-12-18

## 2023-12-18 NOTE — TELEPHONE ENCOUNTER
"Endoscopy Scheduling Screen    Have you had a positive Covid test in the last 14 days?  No    Are you active on MyChart?   Yes    What insurance is in the chart?  Other:  [facility]    Ordering/Referring Provider:   SANTIAGO ONEILL        (If ordering provider performs procedure, schedule with ordering provider unless otherwise instructed. )    BMI: Estimated body mass index is 22 kg/m  as calculated from the following:    Height as of 12/1/23: 1.753 m (5' 9\").    Weight as of 12/1/23: 67.6 kg (149 lb).     Sedation Ordered  moderate sedation.   If patient BMI > 50 do not schedule in [facility].    If patient BMI > 45 do not schedule at [facility].    Are you taking methadone or Suboxone?  No    Are you taking any prescription medications for pain 3 or more times per week?   NO - No RN review required.    Do you have a history of malignant hyperthermia or adverse reaction to anesthesia?  No    (Females) Are you currently pregnant?   No     Have you been diagnosed or told you have pulmonary hypertension?   No    Do you have an LVAD?  No    Have you been told you have moderate to severe sleep apnea?  No    Have you been told you have COPD, asthma, or any other lung disease?  No    Do you have any heart conditions?  No     Have you ever had an organ transplant?   No    Have you ever had or are you awaiting a heart or lung transplant?   No    Have you had a stroke or transient ischemic attack (TIA aka \"mini stroke\" in the last 6 months?   No    Have you been diagnosed with or been told you have cirrhosis of the liver?   No    Are you currently on dialysis?   No    Do you need assistance transferring?   No    BMI: Estimated body mass index is 22 kg/m  as calculated from the following:    Height as of 12/1/23: 1.753 m (5' 9\").    Weight as of 12/1/23: 67.6 kg (149 lb).     Is patients BMI > 40 and scheduling location [facility]?  No    Do you take an injectable medication for weight loss or diabetes (excluding insulin)?  No    Do you take the " medication Naltrexone?  No    Do you take blood thinners?  No       Prep   Are you currently on dialysis or do you have chronic kidney disease?  No    Do you have a diagnosis of diabetes?  No    Do you have a diagnosis of cystic fibrosis (CF)?  No    On a regular basis do you go 3 -5 days between bowel movements?  No    BMI > 40?  No    Preferred Pharmacy:    [id number] - [place] - [address] AT [facility]  [address]  [place] [address]  Phone: [phone] Fax: [phone]    [facility] #[id number] - [place], [place] - [address] AT [facility] [address] ( [facility])  [address]  [place] [address]  Phone: [phone] Fax: [phone]      Final Scheduling Details   Colonoscopy prep sent?  N/A    Procedure scheduled  Upper endoscopy (EGD)    Surgeon:  Shahriar     Date of procedure:  1/2/24     Pre-OP / PAC:   No - Not required for this site.    Location  [facility] - [facility] - Patient preference.    Sedation   Moderate Sedation - Per order.      Patient Reminders:   You will receive a call from a Nurse to review instructions and health history.  This assessment must be completed prior to your procedure.  Failure to complete the Nurse assessment may result in the procedure being cancelled.      On the day of your procedure, please designate an adult(s) who can drive you home stay with you for the next 24 hours. The medicines used in the exam will make you sleepy. You will not be able to drive.      You cannot take public transportation, ride share services, or non-medical taxi service without a responsible caregiver.  Medical transport services are allowed with the requirement that a responsible caregiver will receive you at your destination.  We require that drivers and caregivers are confirmed prior to your procedure.

## 2023-12-18 NOTE — TELEPHONE ENCOUNTER
Pre assessment completed for upcoming procedure.      Procedure details:    Patient scheduled for Upper endoscopy (EGD) on 01.02.2024.     Arrival time: 1345. Procedure time 1430    Pre op exam needed? N/A    Facility location: [facility]; [address], [place] [address]    Sedation type: Conscious sedation     Indication for procedure:   RLQ abdominal pain          COVID policy reviewed.    Designated  policy reviewed. Instructed to have someone stay 6 hours post procedure.       Chart review:     Electronic implanted devices? No    Recent diagnosis of diverticulitis within the last 6 weeks?  No    Diabetic? No    Diabetic medication HOLDING recommendations: (if applicable)  Oral diabetic medications: N/A  Diabetic injectables: N/A  Insulin: N/A    Medication review:    Anticoagulants? No    NSAIDS? No    Other medication HOLDING recommendations:  N/A      Prep for procedure:     Prep instructions sent via [employer]     Reviewed procedure prep instructions.     Patient verbalized understanding and had no questions or concerns at this time.        Christianne Thornton RN  Endoscopy Procedure Pre Assessment RN  [phone] option 4

## 2024-01-02 ENCOUNTER — HOSPITAL ENCOUNTER (OUTPATIENT)
Facility: AMBULATORY SURGERY CENTER | Age: 29
Discharge: HOME | End: 2024-01-02
Attending: SURGERY | Admitting: SURGERY
Payer: COMMERCIAL

## 2024-01-02 VITALS
TEMPERATURE: 99.4 F | RESPIRATION RATE: 16 BRPM | HEART RATE: 51 BPM | DIASTOLIC BLOOD PRESSURE: 67 MMHG | SYSTOLIC BLOOD PRESSURE: 118 MMHG | OXYGEN SATURATION: 96 %

## 2024-01-02 LAB — UPPER GI ENDOSCOPY: NORMAL

## 2024-01-02 PROCEDURE — 43235 EGD DIAGNOSTIC BRUSH WASH: CPT

## 2024-01-02 PROCEDURE — G8907 PT DOC NO EVENTS ON DISCHARG: HCPCS

## 2024-01-02 PROCEDURE — G8918 PT W/O PREOP ORDER IV AB PRO: HCPCS

## 2024-01-02 RX ORDER — NALOXONE HYDROCHLORIDE 0.4 MG/ML
0.2 INJECTION, SOLUTION INTRAMUSCULAR; INTRAVENOUS; SUBCUTANEOUS
Status: DISCONTINUED | OUTPATIENT
Start: 2024-01-02 | End: 2024-01-03 | Stop reason: HOSPADM

## 2024-01-02 RX ORDER — FLUMAZENIL 0.1 MG/ML
0.2 INJECTION, SOLUTION INTRAVENOUS
Status: DISCONTINUED | OUTPATIENT
Start: 2024-01-02 | End: 2024-01-03 | Stop reason: HOSPADM

## 2024-01-02 RX ORDER — ONDANSETRON 2 MG/ML
4 INJECTION INTRAMUSCULAR; INTRAVENOUS EVERY 6 HOURS PRN
Status: DISCONTINUED | OUTPATIENT
Start: 2024-01-02 | End: 2024-01-03 | Stop reason: HOSPADM

## 2024-01-02 RX ORDER — LIDOCAINE 40 MG/G
CREAM TOPICAL
Status: DISCONTINUED | OUTPATIENT
Start: 2024-01-02 | End: 2024-01-03 | Stop reason: HOSPADM

## 2024-01-02 RX ORDER — NALOXONE HYDROCHLORIDE 0.4 MG/ML
0.4 INJECTION, SOLUTION INTRAMUSCULAR; INTRAVENOUS; SUBCUTANEOUS
Status: DISCONTINUED | OUTPATIENT
Start: 2024-01-02 | End: 2024-01-03 | Stop reason: HOSPADM

## 2024-01-02 RX ORDER — PROCHLORPERAZINE MALEATE 10 MG
10 TABLET ORAL EVERY 6 HOURS PRN
Status: DISCONTINUED | OUTPATIENT
Start: 2024-01-02 | End: 2024-01-03 | Stop reason: HOSPADM

## 2024-01-02 RX ORDER — ONDANSETRON 2 MG/ML
4 INJECTION INTRAMUSCULAR; INTRAVENOUS
Status: DISCONTINUED | OUTPATIENT
Start: 2024-01-02 | End: 2024-01-03 | Stop reason: HOSPADM

## 2024-01-02 RX ORDER — ONDANSETRON 4 MG/1
4 TABLET, ORALLY DISINTEGRATING ORAL EVERY 6 HOURS PRN
Status: DISCONTINUED | OUTPATIENT
Start: 2024-01-02 | End: 2024-01-03 | Stop reason: HOSPADM

## 2024-01-02 RX ORDER — FENTANYL CITRATE 50 UG/ML
INJECTION, SOLUTION INTRAMUSCULAR; INTRAVENOUS PRN
Status: DISCONTINUED | OUTPATIENT
Start: 2024-01-02 | End: 2024-01-02 | Stop reason: HOSPADM

## 2024-01-02 NOTE — H&P
Patient seen for Endoscopy    HPI:  Patient is a 28 year old female w abd pain here for endoscopy. Not taking blood thinning medications. No MI or CVA history. No issues with previous sedation. No recent acute illness.    Review Of Systems    Skin: negative  Ears/Nose/Throat: negative  Respiratory: No shortness of breath, dyspnea on exertion, cough, or hemoptysis  Cardiovascular: negative  Gastrointestinal: negative  Genitourinary: negative  Musculoskeletal: negative  Neurologic: negative  Hematologic/Lymphatic/Immunologic: negative  Endocrine: negative      No past medical history on file.    Past Surgical History:   Procedure Laterality Date    WISDOM TOOTH EXTRACTION Bilateral        Family History   Problem Relation Age of Onset    Hypertension Maternal Grandmother     Diabetes Maternal Grandfather     Hypertension Maternal Grandfather     Breast Cancer Maternal Grandfather     Cancer Other         osteosarcoma    Melanoma No family hx of        Social History     Socioeconomic History    Marital status: Single     Spouse name: Not on file    Number of children: Not on file    Years of education: Not on file    Highest education level: Not on file   Occupational History    Not on file   Tobacco Use    Smoking status: Never    Smokeless tobacco: Never    Tobacco comments:     no tobacco exposure   Vaping Use    Vaping Use: Never used   Substance and Sexual Activity    Alcohol use: Yes     Comment: rarely    Drug use: No    Sexual activity: Yes     Partners: Male     Birth control/protection: Pill   Other Topics Concern    Parent/sibling w/ CABG, MI or angioplasty before 65F 55M? Not Asked   Social History Narrative    Not on file     Social Determinants of Health     Financial Resource Strain: Not on file   Food Insecurity: Not on file   Transportation Needs: Not on file   Physical Activity: Not on file   Stress: Not on file   Social Connections: Not on file   Interpersonal Safety: Not on file   Housing  Stability: Not on file       Current Outpatient Medications   Medication Sig Dispense Refill    dicyclomine (BENTYL) 10 MG capsule Take 1 capsule (10 mg) by mouth 2 times daily as needed (as needed, for abdominal cramping and pain) 30 capsule 0    famotidine (PEPCID) 20 MG tablet Take 1 tablet (20 mg) by mouth 2 times daily as needed (heartburn) 60 tablet 0    norethindrone-ethinyl estradiol (NORTREL 1/35, 28,) 1-35 MG-MCG tablet Take 1 tablet by mouth daily 84 tablet 3       Medications and history reviewed    Physical exam:  Vitals: LMP 11/21/2023 (Approximate)   BMI= There is no height or weight on file to calculate BMI.    Constitutional: Healthy, alert, non-distressed   Head: Normo-cephalic, atraumatic, no lesions, masses or tenderness   Cardiovascular: RRR, no new murmurs, +S1, +S2 heart sounds, no clicks, rubs or gallops   Respiratory: CTAB, no rales, rhonchi or wheezing, equal chest rise, good respiratory effort   Gastrointestinal: Soft, non-tender, non distended, no rebound rigidity or guarding, no masses or hernias palpated   : Deferred  Musculoskeletal: Moves all extremities, normal  strength, no deformities noted   Skin: No suspicious lesions or rashes   Psychiatric: Mentation appears normal, affect appropriate   Hematologic/Lymphatic/Immunologic: Normal cervical and supraclavicular lymph nodes   Patient able to get up on table without difficulty.    Labs show:  No results found for this or any previous visit (from the past 24 hour(s)).    Assessment: Endoscopy  Plan: Pt cleared for anesthesia for proposed procedure.    Blu Bess DO

## 2024-01-09 ENCOUNTER — MYC MEDICAL ADVICE (OUTPATIENT)
Dept: GASTROENTEROLOGY | Facility: CLINIC | Age: 29
End: 2024-01-09
Payer: COMMERCIAL

## 2024-01-09 DIAGNOSIS — R10.31 RLQ ABDOMINAL PAIN: ICD-10-CM

## 2024-01-18 RX ORDER — DICYCLOMINE HYDROCHLORIDE 10 MG/1
10 CAPSULE ORAL 2 TIMES DAILY PRN
Qty: 30 CAPSULE | Refills: 2 | Status: SHIPPED | OUTPATIENT
Start: 2024-01-18

## 2024-03-07 ENCOUNTER — PATIENT OUTREACH (OUTPATIENT)
Dept: CARE COORDINATION | Facility: CLINIC | Age: 29
End: 2024-03-07
Payer: COMMERCIAL

## 2024-03-16 DIAGNOSIS — Z30.011 ENCOUNTER FOR INITIAL PRESCRIPTION OF CONTRACEPTIVE PILLS: ICD-10-CM

## 2024-03-18 DIAGNOSIS — Z30.011 ENCOUNTER FOR INITIAL PRESCRIPTION OF CONTRACEPTIVE PILLS: ICD-10-CM

## 2024-03-18 RX ORDER — NORETHINDRONE AND ETHINYL ESTRADIOL 1 MG-35MCG
1 KIT ORAL DAILY
Qty: 84 TABLET | Refills: 0 | Status: SHIPPED | OUTPATIENT
Start: 2024-03-18 | End: 2024-04-02

## 2024-03-18 RX ORDER — NORETHINDRONE AND ETHINYL ESTRADIOL 1 MG-35MCG
1 KIT ORAL DAILY
Qty: 28 TABLET | Refills: 0 | Status: SHIPPED | OUTPATIENT
Start: 2024-03-18 | End: 2024-03-18

## 2024-03-21 ENCOUNTER — PATIENT OUTREACH (OUTPATIENT)
Dept: CARE COORDINATION | Facility: CLINIC | Age: 29
End: 2024-03-21
Payer: COMMERCIAL

## 2024-04-02 ENCOUNTER — OFFICE VISIT (OUTPATIENT)
Dept: FAMILY MEDICINE | Facility: CLINIC | Age: 29
End: 2024-04-02
Payer: COMMERCIAL

## 2024-04-02 VITALS
WEIGHT: 138.8 LBS | DIASTOLIC BLOOD PRESSURE: 72 MMHG | HEIGHT: 69 IN | TEMPERATURE: 98.7 F | OXYGEN SATURATION: 100 % | BODY MASS INDEX: 20.56 KG/M2 | HEART RATE: 62 BPM | RESPIRATION RATE: 20 BRPM | SYSTOLIC BLOOD PRESSURE: 124 MMHG

## 2024-04-02 DIAGNOSIS — K59.9 FUNCTIONAL BOWEL DISORDER: ICD-10-CM

## 2024-04-02 DIAGNOSIS — Z30.011 ENCOUNTER FOR INITIAL PRESCRIPTION OF CONTRACEPTIVE PILLS: ICD-10-CM

## 2024-04-02 DIAGNOSIS — Z80.3 FAMILY HISTORY OF MALIGNANT NEOPLASM OF BREAST: ICD-10-CM

## 2024-04-02 DIAGNOSIS — Z00.00 ROUTINE GENERAL MEDICAL EXAMINATION AT A HEALTH CARE FACILITY: Primary | ICD-10-CM

## 2024-04-02 DIAGNOSIS — Z11.3 SCREEN FOR STD (SEXUALLY TRANSMITTED DISEASE): ICD-10-CM

## 2024-04-02 LAB
HIV 1+2 AB+HIV1 P24 AG SERPL QL IA: NONREACTIVE
T PALLIDUM AB SER QL: NONREACTIVE

## 2024-04-02 PROCEDURE — 87591 N.GONORRHOEAE DNA AMP PROB: CPT | Performed by: FAMILY MEDICINE

## 2024-04-02 PROCEDURE — 99395 PREV VISIT EST AGE 18-39: CPT | Mod: 25 | Performed by: FAMILY MEDICINE

## 2024-04-02 PROCEDURE — 36415 COLL VENOUS BLD VENIPUNCTURE: CPT | Performed by: FAMILY MEDICINE

## 2024-04-02 PROCEDURE — 90480 ADMN SARSCOV2 VAC 1/ONLY CMP: CPT | Performed by: FAMILY MEDICINE

## 2024-04-02 PROCEDURE — 87491 CHLMYD TRACH DNA AMP PROBE: CPT | Performed by: FAMILY MEDICINE

## 2024-04-02 PROCEDURE — 87389 HIV-1 AG W/HIV-1&-2 AB AG IA: CPT | Performed by: FAMILY MEDICINE

## 2024-04-02 PROCEDURE — 91320 SARSCV2 VAC 30MCG TRS-SUC IM: CPT | Performed by: FAMILY MEDICINE

## 2024-04-02 PROCEDURE — 86780 TREPONEMA PALLIDUM: CPT | Performed by: FAMILY MEDICINE

## 2024-04-02 RX ORDER — NORETHINDRONE AND ETHINYL ESTRADIOL 1 MG-35MCG
1 KIT ORAL DAILY
Qty: 84 TABLET | Refills: 3 | Status: SHIPPED | OUTPATIENT
Start: 2024-04-02

## 2024-04-02 SDOH — HEALTH STABILITY: PHYSICAL HEALTH: ON AVERAGE, HOW MANY MINUTES DO YOU ENGAGE IN EXERCISE AT THIS LEVEL?: 20 MIN

## 2024-04-02 SDOH — HEALTH STABILITY: PHYSICAL HEALTH: ON AVERAGE, HOW MANY DAYS PER WEEK DO YOU ENGAGE IN MODERATE TO STRENUOUS EXERCISE (LIKE A BRISK WALK)?: 5 DAYS

## 2024-04-02 ASSESSMENT — SOCIAL DETERMINANTS OF HEALTH (SDOH): HOW OFTEN DO YOU GET TOGETHER WITH FRIENDS OR RELATIVES?: ONCE A WEEK

## 2024-04-02 NOTE — PROGRESS NOTES
Preventive Care Visit  [facility] [place] Resendiz DO, Family Medicine  Apr 2, 2024      A/P:      ICD-10-CM    1. Routine general medical examination at a health care facility  Z00.00       2. Encounter for initial prescription of contraceptive pills  Z30.011 norethindrone-ethinyl estradiol (NORTREL 1/35, 28,) 1-35 MG-MCG tablet      3. Functional bowel disorder  K59.9       4. Screen for STD (sexually transmitted disease)  Z11.3 Chlamydia trachomatis/Neisseria gonorrhoeae by PCR - Clinic Collect     Treponema Abs w Reflex to RPR and Titer     HIV Antigen Antibody Combo      5. Family history of malignant neoplasm of breast  Z80.3         Contraception:  OCP refilled, no concerns.      Functional bowel disorder:  complete eval by GI, using Bentyl and famotidine but bowel function still not optimal.  Recommend pt reach out to GI provider for other options, consideration of functional bowel clinic/dietician for management.    Family h/o breast cancer:  advised pt to check with mom if genetic testing has been done.  She will let me know        Subjective   Rhiannon is a 29 year old, presenting for the following:  Physical        4/2/2024     9:10 AM   Additional Questions   Roomed by Margo MCCLAIN   Accompanied by Self         Health Care Directive  Patient does not have a Health Care Directive or Living Will:     HPI    No concerns       Has been seeing GI, diagnosed with IBS.  Has Bentyl which is effective when she takes it but makes her tired.  Feels symptoms are not really better, just managed and eval was all negative.  Last visit was in jan following EGD.  Most persistent symptoms are lower abdominal/diarrhea.            4/2/2024   General Health   How would you rate your overall physical health? Good   Feel stress (tense, anxious, or unable to sleep) Not at all         4/2/2024   Nutrition   Three or more servings of calcium each day? (!) I DON'T KNOW   Diet: Regular (no restrictions)   How many  servings of fruit and vegetables per day? (!) 0-1   How many sweetened beverages each day? (!) 2         4/2/2024   Exercise   Days per week of moderate/strenous exercise 5 days   Average minutes spent exercising at this level 20 min         4/2/2024   Social Factors   Frequency of gathering with friends or relatives Once a week   Worry food won't last until get money to buy more No   Food not last or not have enough money for food? No   Do you have housing?  Yes   Are you worried about losing your housing? No   Lack of transportation? No   Unable to get utilities (heat,electricity)? No         4/2/2024   Dental   Dentist two times every year? Yes         4/2/2024   TB Screening   Were you born outside of the [place]? No         Today's PHQ-2 Score:       4/2/2024     9:04 AM   PHQ-2 ( 1999 [employer])   Q1: Little interest or pleasure in doing things 0   Q2: Feeling down, depressed or hopeless 0   PHQ-2 Score 0   Q1: Little interest or pleasure in doing things Not at all   Q2: Feeling down, depressed or hopeless Not at all   PHQ-2 Score 0           4/2/2024   Substance Use   Alcohol more than 3/day or more than 7/wk No   Do you use any other substances recreationally? No     Social History     Tobacco Use    Smoking status: Never    Smokeless tobacco: Never    Tobacco comments:     no tobacco exposure   Vaping Use    Vaping Use: Never used   Substance Use Topics    Alcohol use: Yes     Comment: rarely    Drug use: No             4/2/2024   Breast Cancer Screening   Family history of breast, colon, or ovarian cancer? Yes         4/2/2024   LAST FHS-7 RESULTS   1st degree relative breast or ovarian cancer Yes   Any relative bilateral breast cancer Unknown   Any male have breast cancer Yes   Any ONE woman have BOTH breast AND ovarian cancer No   Any woman with breast cancer before 50yrs Unknown   2 or more relatives with breast AND/OR ovarian cancer Unknown   2 or more relatives with breast AND/OR bowel cancer Unknown  "      Maternal grandfather, maternal great aunt and maternal great grandmother all with breast cancer.         Mammogram Screening - Patient under 40 years of age: Routine Mammogram Screening not recommended.         4/2/2024   STI Screening   New sexual partner(s) since last STI/HIV test? No     History of abnormal Pap smear: NO - age 21-29 PAP every 3 years recommended        12/14/2021     5:19 PM 12/18/2017    10:08 AM   PAP / HPV   PAP Negative for Intraepithelial Lesion or Malignancy (NILM)     PAP (Historical)  NIL            4/2/2024   Contraception/Family Planning   Questions about contraception or family planning No        Reviewed and updated as needed this visit by Provider   Tobacco  Allergies  Meds                No past medical history on file.  Past Surgical History:   Procedure Laterality Date    COMBINED ESOPHAGOSCOPY, GASTROSCOPY, DUODENOSCOPY (EGD) WITH CO2 INSUFFLATION N/A 1/2/2024    Procedure: Combined Esophagoscopy, Gastroscopy, Duodenoscopy (Egd) With Co2 Insufflation;  Surgeon: Blu Bess DO;  Location: [facility] OR    WISDOM TOOTH EXTRACTION Bilateral          Review of Systems  Constitutional, HEENT, cardiovascular, pulmonary, gi and gu systems are negative, except as otherwise noted.     Objective    Exam  /72   Pulse 62   Temp 98.7  F (37.1  C) (Tympanic)   Resp 20   Ht 1.746 m (5' 8.75\")   Wt 63 kg (138 lb 12.8 oz)   LMP 03/13/2024   SpO2 100%   BMI 20.65 kg/m     Estimated body mass index is 20.65 kg/m  as calculated from the following:    Height as of this encounter: 1.746 m (5' 8.75\").    Weight as of this encounter: 63 kg (138 lb 12.8 oz).    Physical Exam  GENERAL: alert and no distress  EYES: Eyes grossly normal to inspection, PERRL and conjunctivae and sclerae normal  NECK: no adenopathy, no asymmetry, masses, or scars  RESP: lungs clear to auscultation - no rales, rhonchi or wheezes  CV: regular rate and rhythm, normal S1 S2, no S3 or S4, no murmur, " click or rub, no peripheral edema  ABDOMEN: soft, nontender, no hepatosplenomegaly, no masses and bowel sounds normal  MS: no gross musculoskeletal defects noted, no edema  NEURO: Normal strength and tone, mentation intact and speech normal  PSYCH: mentation appears normal, affect normal/bright        Signed Electronically by: Heather Resendiz DO

## 2024-04-02 NOTE — PATIENT INSTRUCTIONS
Preventive Care Advice   This is general advice given by our system to help you stay healthy. However, your care team may have specific advice just for you. Please talk to your care team about your preventive care needs.  Nutrition  Eat 5 or more servings of fruits and vegetables each day.  Try wheat bread, brown rice and whole grain pasta (instead of white bread, rice, and pasta).  Get enough calcium and vitamin D. Check the label on foods and aim for 100% of the RDA (recommended daily allowance).  Lifestyle  Exercise at least 150 minutes each week   (30 minutes a day, 5 days a week).  Do muscle strengthening activities 2 days a week. These help control your weight and prevent disease.  No smoking.  Wear sunscreen to prevent skin cancer.  Have a dental exam and cleaning every 6 months.  Yearly exams  See your health care team every year to talk about:  Any changes in your health.  Any medicines your care team has prescribed.  Preventive care, family planning, and ways to prevent chronic diseases.  Shots (vaccines)   HPV shots (up to age 26), if you've never had them before.  Hepatitis B shots (up to age 59), if you've never had them before.  COVID-19 shot: Get this shot when it's due.  Flu shot: Get a flu shot every year.  Tetanus shot: Get a tetanus shot every 10 years.  Pneumococcal, hepatitis A, and RSV shots: Ask your care team if you need these based on your risk.  Shingles shot (for age 50 and up).  General health tests  Diabetes screening:  Starting at age 35, Get screened for diabetes at least every 3 years.  If you are younger than age 35, ask your care team if you should be screened for diabetes.  Cholesterol test: At age 39, start having a cholesterol test every 5 years, or more often if advised.  Bone density scan (DEXA): At age 50, ask your care team if you should have this scan for osteoporosis (brittle bones).  Hepatitis C: Get tested at least once in your life.  STIs (sexually transmitted  infections)  Before age 24: Ask your care team if you should be screened for STIs.  After age 24: Get screened for STIs if you're at risk. You are at risk for STIs (including HIV) if:  You are sexually active with more than one person.  You don't use condoms every time.  You or a partner was diagnosed with a sexually transmitted infection.  If you are at risk for HIV, ask about PrEP medicine to prevent HIV.  Get tested for HIV at least once in your life, whether you are at risk for HIV or not.  Cancer screening tests  Cervical cancer screening: If you have a cervix, begin getting regular cervical cancer screening tests at age 21. Most people who have regular screenings with normal results can stop after age 65. Talk about this with your provider.  Breast cancer scan (mammogram): If you've ever had breasts, begin having regular mammograms starting at age 40. This is a scan to check for breast cancer.  Colon cancer screening: It is important to start screening for colon cancer at age 45.  Have a colonoscopy test every 10 years (or more often if you're at risk) Or, ask your provider about stool tests like a FIT test every year or Cologuard test every 3 years.  To learn more about your testing options, visit: https://www.[employer]/054709.pdf.  For help making a decision, visit: https://bit.ly/ku65004.  Prostate cancer screening test: If you have a prostate and are age 55 to 69, ask your provider if you would benefit from a yearly prostate cancer screening test.  Lung cancer screening: If you are a current or former smoker age 50 to 80, ask your care team if ongoing lung cancer screenings are right for you.  For informational purposes only. Not to replace the advice of your health care provider. Copyright   2023 [employer]. All rights reserved. Clinically reviewed by the [facility]. [id number] - REV 01/24.

## 2024-04-03 LAB
C TRACH DNA SPEC QL PROBE+SIG AMP: NEGATIVE
N GONORRHOEA DNA SPEC QL NAA+PROBE: NEGATIVE

## 2025-01-14 ENCOUNTER — MYC REFILL (OUTPATIENT)
Dept: FAMILY MEDICINE | Facility: CLINIC | Age: 30
End: 2025-01-14
Payer: COMMERCIAL

## 2025-01-14 DIAGNOSIS — Z30.011 ENCOUNTER FOR INITIAL PRESCRIPTION OF CONTRACEPTIVE PILLS: ICD-10-CM

## 2025-01-14 RX ORDER — NORETHINDRONE AND ETHINYL ESTRADIOL 1 MG-35MCG
1 KIT ORAL DAILY
Qty: 84 TABLET | Refills: 0 | Status: SHIPPED | OUTPATIENT
Start: 2025-01-14

## 2025-03-03 ENCOUNTER — PATIENT OUTREACH (OUTPATIENT)
Dept: CARE COORDINATION | Facility: CLINIC | Age: 30
End: 2025-03-03
Payer: COMMERCIAL

## (undated) RX ORDER — FENTANYL CITRATE 50 UG/ML
INJECTION, SOLUTION INTRAMUSCULAR; INTRAVENOUS
Status: DISPENSED
Start: 2024-01-02